# Patient Record
Sex: MALE | Race: BLACK OR AFRICAN AMERICAN | Employment: FULL TIME | URBAN - METROPOLITAN AREA
[De-identification: names, ages, dates, MRNs, and addresses within clinical notes are randomized per-mention and may not be internally consistent; named-entity substitution may affect disease eponyms.]

---

## 2017-01-04 ENCOUNTER — OFFICE VISIT (OUTPATIENT)
Dept: INTERNAL MEDICINE CLINIC | Age: 62
End: 2017-01-04

## 2017-01-04 VITALS
SYSTOLIC BLOOD PRESSURE: 120 MMHG | BODY MASS INDEX: 31.7 KG/M2 | WEIGHT: 247 LBS | HEIGHT: 74 IN | HEART RATE: 77 BPM | RESPIRATION RATE: 18 BRPM | OXYGEN SATURATION: 95 % | TEMPERATURE: 97.7 F | DIASTOLIC BLOOD PRESSURE: 74 MMHG

## 2017-01-04 DIAGNOSIS — I10 ESSENTIAL HYPERTENSION: Primary | ICD-10-CM

## 2017-01-04 DIAGNOSIS — C61 PROSTATE CANCER (HCC): ICD-10-CM

## 2017-01-04 DIAGNOSIS — N18.3 CKD (CHRONIC KIDNEY DISEASE), STAGE 3 (MODERATE): ICD-10-CM

## 2017-01-04 DIAGNOSIS — R73.9 HYPERGLYCEMIA: ICD-10-CM

## 2017-01-04 DIAGNOSIS — E78.00 HYPERCHOLESTEREMIA: ICD-10-CM

## 2017-01-04 NOTE — PROGRESS NOTES
SUBJECTIVE:   Mr. Mingo Rosas. is a 63 y.o. male who is here for follow up of routine medical issues. Previously he saw Dr. Dimple Guillermo, and before that Dr. Dhaliwal Files. Chief Complaint   Patient presents with    Hypertension    Follow-up     pt here today 6 month f/u    Other     pt refused flu shot       Plans to get colonoscopy soon with Dr. Richard Stein. Now sees a urologist at [facility]; no longer sees Dr. Flaquito Pichardo due to insurance; He has had prostate cancer; He completed XRT in 2014. He sees Dr. Hermelinda Mancilla, for CKD. He saw Dr. Cintia Faye and had PFT, due to sarcoid. This issue is quiescent. He had diagnosis years ago, with liver biopsy. He gets yearly eye check. At this time, he is otherwise doing well and has brought no other complaints to my attention today. For a list of the medical issues addressed today, see the assessment and plan below. PMH:   Past Medical History   Diagnosis Date    Arthritis     CKD (chronic kidney disease) 2014    Hypercholesterolemia     Hypertension     [facility] ([facility] [address] 2014    Sarcoid [facility])        Past Surgical History   Procedure Laterality Date    Hx prostatectomy         All: He has No Known Allergies. Current Outpatient Prescriptions   Medication Sig    losartan-hydrochlorothiazide (HYZAAR) 50-12.5 mg per tablet Take 1 Tab by mouth daily.  simvastatin (ZOCOR) 10 mg tablet Take 1 Tab by mouth nightly.  famotidine (PEPCID) 20 mg tablet Take 1 Tab by mouth two (2) times a day. No current facility-administered medications for this visit. FH: His mother  68 of lung cancer, HTN. Father  of lung cancer age 68. One sister  age 1 of heart defect. Sister and brother are alive and well. 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 He has never used smokeless tobacco. He reports that he drinks alcohol.  He reports that he does not use illicit drugs.   ROS: See above; Complete ROS otherwise negative. OBJECTIVE:   Vitals:   Visit Vitals    /74 (BP 1 Location: Left arm, BP Patient Position: Sitting)    Pulse 77    Temp 97.7 °F (36.5 °C) (Oral)    Resp 18    Ht 6' 2\" (1.88 m)    Wt 247 lb (112 kg)    SpO2 95%    BMI 31.71 kg/m2      Gen: Pleasant 63 y.o. W male in NAD. HEENT: PERRLA. EOMI. OP moist and pink. Neck: Supple. No LAD. HEART: RRR, No M/G/R.    LUNGS: CTAB No W/R. ABDOMEN: S, NT, ND, BS+. EXTREMITIES: Warm. No C/C/E.  MUSCULOSKELETAL: Normal ROM, muscle strength 5/5 all groups. NEURO: Alert and oriented x 3. Cranial nerves grossly intact. No focal sensory or motor deficits noted. SKIN: Warm. Dry. No rashes or other lesions noted. Lab Results   Component Value Date/Time    Sodium 142 07/01/2016 10:04 AM    Potassium 4.3 07/01/2016 10:04 AM    Chloride 103 07/01/2016 10:04 AM    CO2 24 07/01/2016 10:04 AM    Anion gap 8 08/06/2013 05:00 AM    Glucose 117 07/01/2016 10:04 AM    BUN 22 07/01/2016 10:04 AM    Creatinine 1.44 07/01/2016 10:04 AM    BUN/Creatinine ratio 15 07/01/2016 10:04 AM    GFR est AA 61 07/01/2016 10:04 AM    GFR est non-AA 53 07/01/2016 10:04 AM    Calcium 9.2 07/01/2016 10:04 AM    Bilirubin, total 0.7 07/01/2016 10:04 AM    ALT 40 07/01/2016 10:04 AM    AST 31 07/01/2016 10:04 AM    Alk.  phosphatase 131 07/01/2016 10:04 AM    Protein, total 7.2 07/01/2016 10:04 AM    Albumin 4.2 07/01/2016 10:04 AM    Globulin 5.5 07/31/2013 12:55 PM    A-G Ratio 1.4 07/01/2016 10:04 AM       Lab Results   Component Value Date/Time    Cholesterol, total 175 07/01/2016 10:04 AM    HDL Cholesterol 43 07/01/2016 10:04 AM    LDL,Direct 93 05/21/2014 12:26 PM    LDL, calculated 101 07/01/2016 10:04 AM    Triglyceride 154 07/01/2016 10:04 AM        Lab Results   Component Value Date/Time    Hemoglobin A1c 6.1 07/01/2016 10:04 AM       Lab Results   Component Value Date/Time    WBC 7.0 07/01/2016 10:04 AM    HGB 14.9 07/01/2016 10:04 AM    HCT 42.2 07/01/2016 10:04 AM    PLATELET 042 37/70/0932 10:04 AM    MCV 90 07/01/2016 10:04 AM         ASSESSMENT/ PLAN:   1. HTN (hypertension): BP fine today. 2. Hypercholesteremia: Last labs look normal. No changes. 3. CKD (chronic kidney disease), stage 3 (moderate): GFR as above. Mild. Sees Dr. Pippa Morris yearly. 4. Prostate cancer: s/p XRT and previous follow up Dr. Hill. He'll need to make an appointment with an in-network urologist.   5. Knee pain, right:  Doing better. 6. Sarcoid: He has seen Dr. Trae Velarde. 7. Hyperglycemia / prediabetes: Watch diet and exercise. 8. Colon cancer screening: Refer to Dr. Veronica Mcneil. Follow-up Disposition: Not on File    I have reviewed the patient's medications and risks/side effects/benefits were discussed. Diagnosis(-es) explained to patient and questions answered. Literature provided where appropriate.

## 2017-01-04 NOTE — PROGRESS NOTES
1. Have you been to the ER, urgent care clinic since your last visit? Hospitalized since your last visit?no    2. Have you seen or consulted any other health care providers outside of the [address] [place] [address] since your last visit? Include any pap smears or colon screening.  no

## 2017-01-04 NOTE — MR AVS SNAPSHOT
Visit Information Date & Time Provider Department Dept. Phone Encounter #  
 1/4/2017 10:30 AM Fab Jhaveri, [address] [place] [address],[address] [phone] [id number] Follow-up Instructions Return in about 6 months (around 7/4/2017) for follow up HTN, etc.  
 Follow-up and Disposition History Upcoming Health Maintenance Date Due COLONOSCOPY 7/10/1975 Pneumococcal 19-64 Highest Risk (1 of 3 - PCV13) 7/10/1976 DTaP/Tdap/Td series (1 - Tdap) 7/10/1978 INFLUENZA AGE 9 TO ADULT 8/1/2016 Allergies as of 1/4/2017  Review Complete On: 1/4/2017 By: Fab Jhaveri MD  
 No Known Allergies Current Immunizations  Reviewed on 11/6/2014 No immunizations on file. Not reviewed this visit You Were Diagnosed With   
  
 Codes Comments Essential hypertension    -  Primary ICD-10-CM: I10 
ICD-9-CM: 401.9 Hypercholesteremia     ICD-10-CM: E78.00 ICD-9-CM: 272.0 CKD (chronic kidney disease), stage 3 (moderate)     ICD-10-CM: N18.3 ICD-9-CM: 593. 3 Prostate cancer [facility])     ICD-10-CM: V96 ICD-9-CM: 360 Hyperglycemia     ICD-10-CM: R73.9 ICD-9-CM: 790.29 Vitals BP Pulse Temp Resp Height(growth percentile) Weight(growth percentile) 120/74 (BP 1 Location: Left arm, BP Patient Position: Sitting) 77 97.7 °F (36.5 °C) (Oral) 18 6' 2\" (1.88 m) 247 lb (112 kg) SpO2 BMI Smoking Status 95% 31.71 kg/m2 Never Smoker Vitals History BMI and BSA Data Body Mass Index Body Surface Area 31.71 kg/m 2 2.42 m 2 Preferred Pharmacy Pharmacy Name Phone [facility] [address] [phone] Your Updated Medication List  
  
   
This list is accurate as of: 1/4/17 11:22 AM.  Always use your most recent med list.  
  
  
  
  
 famotidine 20 mg tablet Commonly known as:  PEPCID Take 1 Tab by mouth two (2) times a day. losartan-hydroCHLOROthiazide 50-12.5 mg per tablet Commonly known as:  HYZAAR Take 1 Tab by mouth daily. simvastatin 10 mg tablet Commonly known as:  ZOCOR Take 1 Tab by mouth nightly. Follow-up Instructions Return in about 6 months (around 7/4/2017) for follow up HTN, etc.  
  
To-Do List   
 05/01/2017 Lab:  CBC WITH AUTOMATED DIFF   
  
 05/01/2017 Lab:  LIPID PANEL   
  
 05/01/2017 Lab:  METABOLIC PANEL, COMPREHENSIVE   
  
 05/01/2017 Lab:  PSA DIAGNOSTIC (PROSTATIC SPECIFIC AG) Introducing [facility] SERVICES! Dear Kira Velez: Thank you for requesting a Give[employer] account. Our records indicate that you have previously registered for a Giveo account but its currently inactive. Please call our Giveo support line at [phone]. Additional Information If you have questions, please visit the Frequently Asked Questions section of the Giveo website at [email or web address] Remember, Giveo is NOT to be used for urgent needs. For medical emergencies, dial 911. Now available from your iPhone and Android! Please provide this summary of care documentation to your next provider. Your primary care clinician is listed as [place]. If you have any questions after today's visit, please call [phone].

## 2017-01-05 LAB
ALBUMIN SERPL-MCNC: 4.2 G/DL (ref 3.5–5.5)
ALBUMIN/GLOB SERPL: 1.4 {RATIO} (ref 1.1–2.5)
ALP SERPL-CCNC: 134 IU/L (ref 39–117)
ALT SERPL-CCNC: 42 IU/L (ref 0–44)
AST SERPL-CCNC: 33 IU/L (ref 0–40)
BASOPHILS # BLD AUTO: 0 X10E3/UL (ref 0–0.2)
BASOPHILS NFR BLD AUTO: 0 %
BILIRUB SERPL-MCNC: 0.6 MG/DL (ref 0–1.2)
BUN SERPL-MCNC: 15 MG/DL (ref 6–24)
BUN/CREAT SERPL: 12 (ref 9–20)
CALCIUM SERPL-MCNC: 9.1 MG/DL (ref 8.7–10.2)
CHLORIDE SERPL-SCNC: 100 MMOL/L (ref 96–106)
CHOLEST SERPL-MCNC: 214 MG/DL (ref 100–199)
CO2 SERPL-SCNC: 25 MMOL/L (ref 18–29)
CREAT SERPL-MCNC: 1.28 MG/DL (ref 0.76–1.27)
EOSINOPHIL # BLD AUTO: 0.2 X10E3/UL (ref 0–0.4)
EOSINOPHIL NFR BLD AUTO: 2 %
ERYTHROCYTE [DISTWIDTH] IN BLOOD BY AUTOMATED COUNT: 14.2 % (ref 12.3–15.4)
GLOBULIN SER CALC-MCNC: 2.9 G/DL (ref 1.5–4.5)
GLUCOSE SERPL-MCNC: 83 MG/DL (ref 65–99)
HCT VFR BLD AUTO: 42.8 % (ref 37.5–51)
HDLC SERPL-MCNC: 43 MG/DL
HGB BLD-MCNC: 15 G/DL (ref 12.6–17.7)
IMM GRANULOCYTES # BLD: 0 X10E3/UL (ref 0–0.1)
IMM GRANULOCYTES NFR BLD: 0 %
LDLC SERPL CALC-MCNC: 126 MG/DL (ref 0–99)
LYMPHOCYTES # BLD AUTO: 2.4 X10E3/UL (ref 0.7–3.1)
LYMPHOCYTES NFR BLD AUTO: 28 %
MCH RBC QN AUTO: 32.1 PG (ref 26.6–33)
MCHC RBC AUTO-ENTMCNC: 35 G/DL (ref 31.5–35.7)
MCV RBC AUTO: 92 FL (ref 79–97)
MONOCYTES # BLD AUTO: 0.9 X10E3/UL (ref 0.1–0.9)
MONOCYTES NFR BLD AUTO: 10 %
NEUTROPHILS # BLD AUTO: 5.2 X10E3/UL (ref 1.4–7)
NEUTROPHILS NFR BLD AUTO: 60 %
PLATELET # BLD AUTO: 177 X10E3/UL (ref 150–379)
POTASSIUM SERPL-SCNC: 4.3 MMOL/L (ref 3.5–5.2)
PROT SERPL-MCNC: 7.1 G/DL (ref 6–8.5)
PSA SERPL-MCNC: 3.9 NG/ML (ref 0–4)
RBC # BLD AUTO: 4.67 X10E6/UL (ref 4.14–5.8)
SODIUM SERPL-SCNC: 141 MMOL/L (ref 134–144)
TRIGL SERPL-MCNC: 226 MG/DL (ref 0–149)
VLDLC SERPL CALC-MCNC: 45 MG/DL (ref 5–40)
WBC # BLD AUTO: 8.8 X10E3/UL (ref 3.4–10.8)

## 2017-01-06 ENCOUNTER — TELEPHONE (OUTPATIENT)
Dept: INTERNAL MEDICINE CLINIC | Age: 62
End: 2017-01-06

## 2017-09-29 ENCOUNTER — HOSPITAL ENCOUNTER (EMERGENCY)
Age: 62
Discharge: HOME | End: 2017-09-29
Attending: FAMILY MEDICINE

## 2017-09-29 ENCOUNTER — APPOINTMENT (OUTPATIENT)
Dept: GENERAL RADIOLOGY | Age: 62
End: 2017-09-29
Attending: FAMILY MEDICINE

## 2017-09-29 VITALS
OXYGEN SATURATION: 97 % | TEMPERATURE: 98.6 F | RESPIRATION RATE: 18 BRPM | HEART RATE: 84 BPM | SYSTOLIC BLOOD PRESSURE: 137 MMHG | HEIGHT: 74 IN | DIASTOLIC BLOOD PRESSURE: 78 MMHG | BODY MASS INDEX: 31.18 KG/M2 | WEIGHT: 243 LBS

## 2017-09-29 DIAGNOSIS — M54.50 ACUTE LEFT-SIDED LOW BACK PAIN WITHOUT SCIATICA: Primary | ICD-10-CM

## 2017-09-29 LAB
BILIRUB UR QL: NEGATIVE
GLUCOSE UR QL STRIP.AUTO: NEGATIVE MG/DL
KETONES UR-MCNC: NEGATIVE MG/DL
LEUKOCYTE ESTERASE UR QL STRIP: NEGATIVE
NITRITE UR QL: NEGATIVE
PH UR: 6 [PH] (ref 5–8)
PROT UR QL: NEGATIVE MG/DL
RBC # UR STRIP: NEGATIVE /UL
SP GR UR: 1.02 (ref 1–1.03)
UROBILINOGEN UR QL: 0.2 EU/DL (ref 0.2–1)

## 2017-09-29 RX ORDER — CYCLOBENZAPRINE HCL 10 MG
10 TABLET ORAL
Qty: 20 TAB | Refills: 0 | Status: SHIPPED | OUTPATIENT
Start: 2017-09-29 | End: 2017-11-14

## 2017-09-29 RX ORDER — PREDNISONE 5 MG/1
TABLET ORAL
Qty: 21 TAB | Refills: 0 | Status: SHIPPED | OUTPATIENT
Start: 2017-09-29 | End: 2017-11-14

## 2017-09-29 NOTE — UC PROVIDER NOTE
Patient is a 63 y.o. male presenting with back pain. The history is provided by the patient. Back Pain    This is a new problem. The current episode started 2 days ago. The problem has not changed since onset. Episode frequency: intermittent with movement. The pain is associated with no known injury. Pain location: left flank; sometimes right flank. The quality of the pain is described as aching. The pain does not radiate. The pain is at a severity of 5/10. Pertinent negatives include no chest pain, no fever, no numbness, no dysuria, no leg pain, no paresthesias, no paresis, no tingling and no weakness. He has tried nothing for the symptoms. Past Medical History:   Diagnosis Date    Arthritis     CKD (chronic kidney disease) 5/21/2014    Hypercholesterolemia     Hypertension     Prostate cancer ([facility] [address] 5/21/2014    [facility]         Past Surgical History:   Procedure Laterality Date    HX PROSTATECTOMY           Family History   Problem Relation Age of Onset    Cancer Mother      Lung Cancer and Brain    Hypertension Mother     Cancer Father 68     Lung Cancer        Social History     Social History    Marital status: SINGLE     Spouse name: N/A    Number of children: N/A    Years of education: N/A     Occupational History    Not on file. Social History Main Topics    Smoking status: Never Smoker    Smokeless tobacco: Never Used    Alcohol use Yes    Drug use: No    Sexual activity: Yes     Partners: Female     Other Topics Concern    Not on file     Social History Narrative                ALLERGIES: Review of patient's allergies indicates no known allergies. Review of Systems   Constitutional: Negative for chills and fever. Respiratory: Negative for shortness of breath and wheezing. Cardiovascular: Negative for chest pain and palpitations. Gastrointestinal: Negative for nausea and vomiting. Genitourinary: Negative for dysuria and frequency.    Musculoskeletal: Positive for back pain and myalgias. Neurological: Negative for tingling, weakness, numbness and paresthesias. Vitals:    09/29/17 1134   BP: 137/78   Pulse: 84   Resp: 18   Temp: 98.6 °F (37 °C)   SpO2: 97%   Weight: 110.2 kg (243 lb)   Height: 6' 2\" (1.88 m)       Physical Exam   Constitutional: He appears well-developed and well-nourished. No distress. Cardiovascular: Normal rate, regular rhythm and normal heart sounds. Pulmonary/Chest: Effort normal and breath sounds normal. No respiratory distress. He has no wheezes. He has no rales. Musculoskeletal:        Lumbar back: He exhibits pain. He exhibits normal range of motion, no tenderness, no bony tenderness, no swelling, no edema, no deformity, no laceration and no spasm. Back:    Neurological: He is alert. Skin: He is not diaphoretic. Psychiatric: He has a normal mood and affect. His behavior is normal. Judgment and thought content normal.   Nursing note and vitals reviewed. MDM     Differential Diagnosis; Clinical Impression; Plan:     CLINICAL IMPRESSION:  Acute left-sided low back pain without sciatica  (primary encounter diagnosis)    Plan:  1. Pred concha  2. Flexeril prn  3. stretching exercises  Amount and/or Complexity of Data Reviewed:   Tests in the radiology section of CPT®:  Ordered and reviewed  Risk of Significant Complications, Morbidity, and/or Mortality:   Presenting problems: Moderate  Diagnostic procedures: Moderate  Management options:   Moderate  Progress:   Patient progress:  Stable      Procedures    Lab Results             POC URINE MACROSCOPIC (Final result) Component (Lab Inquiry)       Collection Time Result Time [address] BLDU UROUPC NITUPC LEUKPC     09/29/17 12:06:00 09/29/17 12:07:47 1.020 6.0 NEGATIVE  NEGATIVE  NEGATIVE  NEGATIVE  NEGATIVE  0.2 NEGATIVE  NEGATIVE

## 2017-09-29 NOTE — DISCHARGE INSTRUCTIONS
Back Pain: Care Instructions  Your Care Instructions    Back pain has many possible causes. It is often related to problems with muscles and ligaments of the back. It may also be related to problems with the nerves, discs, or bones of the back. Moving, lifting, standing, sitting, or sleeping in an awkward way can strain the back. Sometimes you don't notice the injury until later. Arthritis is another common cause of back pain. Although it may hurt a lot, back pain usually improves on its own within several weeks. Most people recover in 12 weeks or less. Using good home treatment and being careful not to stress your back can help you feel better sooner. Follow-up care is a key part of your treatment and safety. Be sure to make and go to all appointments, and call your doctor if you are having problems. Its also a good idea to know your test results and keep a list of the medicines you take. How can you care for yourself at home? · Sit or lie in positions that are most comfortable and reduce your pain. Try one of these positions when you lie down:  ¨ Lie on your back with your knees bent and supported by large pillows. ¨ Lie on the floor with your legs on the seat of a sofa or chair. Haylee Victoria on your side with your knees and hips bent and a pillow between your legs. ¨ Lie on your stomach if it does not make pain worse. · Do not sit up in bed, and avoid soft couches and twisted positions. Bed rest can help relieve pain at first, but it delays healing. Avoid bed rest after the first day of back pain. · Change positions every 30 minutes. If you must sit for long periods of time, take breaks from sitting. Get up and walk around, or lie in a comfortable position. · Try using a heating pad on a low or medium setting for 15 to 20 minutes every 2 or 3 hours. Try a warm shower in place of one session with the heating pad. · You can also try an ice pack for 10 to 15 minutes every 2 to 3 hours.  Put a thin cloth between the ice pack and your skin. · Take pain medicines exactly as directed. ¨ If the doctor gave you a prescription medicine for pain, take it as prescribed. ¨ If you are not taking a prescription pain medicine, ask your doctor if you can take an over-the-counter medicine. · Take short walks several times a day. You can start with 5 to 10 minutes, 3 or 4 times a day, and work up to longer walks. Walk on level surfaces and avoid hills and stairs until your back is better. · Return to work and other activities as soon as you can. Continued rest without activity is usually not good for your back. · To prevent future back pain, do exercises to stretch and strengthen your back and stomach. Learn how to use good posture, safe lifting techniques, and proper body mechanics. When should you call for help? Call your doctor now or seek immediate medical care if:  · You have new or worsening numbness in your legs. · You have new or worsening weakness in your legs. (This could make it hard to stand up.)  · You lose control of your bladder or bowels. Watch closely for changes in your health, and be sure to contact your doctor if:  · Your pain gets worse. · You are not getting better after 2 weeks. Where can you learn more? Go to http://nilda-anastacio.info/. Enter K984 in the search box to learn more about \"Back Pain: Care Instructions. \"  Current as of: March 21, 2017  Content Version: 11.3  © 5748-1528 [employer]. Care instructions adapted under license by [employer] (which disclaims liability or warranty for this information). If you have questions about a medical condition or this instruction, always ask your healthcare professional. [address] any warranty or liability for your use of this information. Low Back Pain: Exercises  Your Care Instructions  Here are some examples of typical rehabilitation exercises for your condition.  Start each exercise slowly. Ease off the exercise if you start to have pain. Your doctor or physical therapist will tell you when you can start these exercises and which ones will work best for you. How to do the exercises  Press-up    1. Lie on your stomach, supporting your body with your forearms. 2. Press your elbows down into the floor to raise your upper back. As you do this, relax your stomach muscles and allow your back to arch without using your back muscles. As your press up, do not let your hips or pelvis come off the floor. 3. Hold for 15 to 30 seconds, then relax. 4. Repeat 2 to 4 times. Alternate arm and leg (bird dog) exercise    Note: Do this exercise slowly. Try to keep your body straight at all times, and do not let one hip drop lower than the other. 1. Start on the floor, on your hands and knees. 2. Tighten your belly muscles. 3. Raise one leg off the floor, and hold it straight out behind you. Be careful not to let your hip drop down, because that will twist your trunk. 4. Hold for about 6 seconds, then lower your leg and switch to the other leg. 5. Repeat 8 to 12 times on each leg. 6. Over time, work up to holding for 10 to 30 seconds each time. 7. If you feel stable and secure with your leg raised, try raising the opposite arm straight out in front of you at the same time. Knee-to-chest exercise    1. Lie on your back with your knees bent and your feet flat on the floor. 2. Bring one knee to your chest, keeping the other foot flat on the floor (or keeping the other leg straight, whichever feels better on your lower back). 3. Keep your lower back pressed to the floor. Hold for at least 15 to 30 seconds. 4. Relax, and lower the knee to the starting position. 5. Repeat with the other leg. Repeat 2 to 4 times with each leg. 6. To get more stretch, put your other leg flat on the floor while pulling your knee to your chest.  Curl-ups    1.  Lie on the floor on your back with your knees bent at a 90-degree angle. Your feet should be flat on the floor, about 12 inches from your buttocks. 2. Cross your arms over your chest. If this bothers your neck, try putting your hands behind your neck (not your head), with your elbows spread apart. 3. Slowly tighten your belly muscles and raise your shoulder blades off the floor. 4. Keep your head in line with your body, and do not press your chin to your chest.  5. Hold this position for 1 or 2 seconds, then slowly lower yourself back down to the floor. 6. Repeat 8 to 12 times. Pelvic tilt exercise    1. Lie on your back with your knees bent. 2. \"Brace\" your stomach. This means to tighten your muscles by pulling in and imagining your belly button moving toward your spine. You should feel like your back is pressing to the floor and your hips and pelvis are rocking back. 3. Hold for about 6 seconds while you breathe smoothly. 4. Repeat 8 to 12 times. Heel dig bridging    1. Lie on your back with both knees bent and your ankles bent so that only your heels are digging into the floor. Your knees should be bent about 90 degrees. 2. Then push your heels into the floor, squeeze your buttocks, and lift your hips off the floor until your shoulders, hips, and knees are all in a straight line. 3. Hold for about 6 seconds as you continue to breathe normally, and then slowly lower your hips back down to the floor and rest for up to 10 seconds. 4. Do 8 to 12 repetitions. Hamstring stretch in doorway    1. Lie on your back in a doorway, with one leg through the open door. 2. Slide your leg up the wall to straighten your knee. You should feel a gentle stretch down the back of your leg. 3. Hold the stretch for at least 15 to 30 seconds. Do not arch your back, point your toes, or bend either knee. Keep one heel touching the floor and the other heel touching the wall. 4. Repeat with your other leg. 5. Do 2 to 4 times for each leg. Hip flexor stretch    1.  Kneel on the floor with one knee bent and one leg behind you. Place your forward knee over your foot. Keep your other knee touching the floor. 2. Slowly push your hips forward until you feel a stretch in the upper thigh of your rear leg. 3. Hold the stretch for at least 15 to 30 seconds. Repeat with your other leg. 4. Do 2 to 4 times on each side. Wall sit    1. Stand with your back 10 to 12 inches away from a wall. 2. Lean into the wall until your back is flat against it. 3. Slowly slide down until your knees are slightly bent, pressing your lower back into the wall. 4. Hold for about 6 seconds, then slide back up the wall. 5. Repeat 8 to 12 times. Follow-up care is a key part of your treatment and safety. Be sure to make and go to all appointments, and call your doctor if you are having problems. It's also a good idea to know your test results and keep a list of the medicines you take. Where can you learn more? Go to http://nilda-anastacio.info/. Enter F763 in the search box to learn more about \"Low Back Pain: Exercises. \"  Current as of: March 21, 2017  Content Version: 11.3  © 5922-3983 [employer]. Care instructions adapted under license by [employer] (which disclaims liability or warranty for this information). If you have questions about a medical condition or this instruction, always ask your healthcare professional. [place] [address] any warranty or liability for your use of this information.

## 2017-10-17 ENCOUNTER — TELEPHONE (OUTPATIENT)
Dept: INTERNAL MEDICINE CLINIC | Age: 62
End: 2017-10-17

## 2017-10-17 NOTE — TELEPHONE ENCOUNTER
----- [facility] sent at 10/17/2017 11:46 AM EDT -----  Regarding: [place]/records  Elda with [employer] is requesting the status of records. She faxed a request on 10/12/17. [facility] phone number is [phone].

## 2017-10-17 NOTE — TELEPHONE ENCOUNTER
Attempted to return call regarding status of records. Voicemail does not state a persons name, no company, no information on message; other than can not accept call to please leave a message. I did not leave a voicemail because it did not verify in any way that it was a company or anyone needing this patients medical records.

## 2017-11-14 ENCOUNTER — OFFICE VISIT (OUTPATIENT)
Dept: INTERNAL MEDICINE CLINIC | Age: 62
End: 2017-11-14

## 2017-11-14 VITALS
TEMPERATURE: 97.9 F | WEIGHT: 247.8 LBS | RESPIRATION RATE: 16 BRPM | OXYGEN SATURATION: 95 % | HEIGHT: 74 IN | SYSTOLIC BLOOD PRESSURE: 137 MMHG | BODY MASS INDEX: 31.8 KG/M2 | DIASTOLIC BLOOD PRESSURE: 83 MMHG | HEART RATE: 85 BPM

## 2017-11-14 DIAGNOSIS — C61 PROSTATE CANCER (HCC): ICD-10-CM

## 2017-11-14 DIAGNOSIS — Z12.11 SCREEN FOR COLON CANCER: ICD-10-CM

## 2017-11-14 DIAGNOSIS — Z00.00 ROUTINE GENERAL MEDICAL EXAMINATION AT A HEALTH CARE FACILITY: Primary | ICD-10-CM

## 2017-11-14 DIAGNOSIS — I10 ESSENTIAL HYPERTENSION: ICD-10-CM

## 2017-11-14 DIAGNOSIS — N18.30 STAGE 3 CHRONIC KIDNEY DISEASE (HCC): ICD-10-CM

## 2017-11-14 DIAGNOSIS — E78.00 HYPERCHOLESTEREMIA: ICD-10-CM

## 2017-11-14 RX ORDER — SIMVASTATIN 10 MG/1
10 TABLET, FILM COATED ORAL
Qty: 30 TAB | Refills: 5 | Status: SHIPPED | OUTPATIENT
Start: 2017-11-14 | End: 2018-08-15 | Stop reason: SDUPTHER

## 2017-11-14 NOTE — MR AVS SNAPSHOT
Visit Information Date & Time Provider Department Dept. Phone Encounter #  
 11/14/2017 11:30 AM Braeden Ervin, [address] [place] [address],[address] [phone] [id number] Follow-up Instructions Return in about 6 months (around 5/14/2018) for HTN. Upcoming Health Maintenance Date Due COLONOSCOPY 7/10/1975 Pneumococcal 19-64 Highest Risk (1 of 3 - PCV13) 7/10/1976 DTaP/Tdap/Td series (1 - Tdap) 7/10/1978 ZOSTER VACCINE AGE 60> 5/10/2017 Allergies as of 11/14/2017  Review Complete On: 11/14/2017 By: Braeden Ervin MD  
 No Known Allergies Current Immunizations  Reviewed on 11/6/2014 No immunizations on file. Not reviewed this visit You Were Diagnosed With   
  
 Codes Comments Essential hypertension    -  Primary ICD-10-CM: I10 
ICD-9-CM: 401.9 Stage 3 chronic kidney disease     ICD-10-CM: N18.3 ICD-9-CM: 656. 3 Hypercholesteremia     ICD-10-CM: E78.00 ICD-9-CM: 272.0 Prostate cancer [facility])     ICD-10-CM: Z31 ICD-9-CM: 909 Screen for colon cancer     ICD-10-CM: Z12.11 ICD-9-CM: V76.51 Vitals BP Pulse Temp Resp Height(growth percentile) Weight(growth percentile) 137/83 (BP 1 Location: Left arm, BP Patient Position: Sitting) 85 97.9 °F (36.6 °C) (Oral) 16 6' 2\" (1.88 m) 247 lb 12.8 oz (112.4 kg) SpO2 BMI Smoking Status 95% 31.82 kg/m2 Never Smoker Vitals History BMI and BSA Data Body Mass Index Body Surface Area  
 31.82 kg/m 2 2.42 m 2 [facility] [phone] Your Updated Medication List  
  
   
This list is accurate as of: 11/14/17 12:22 PM.  Always use your most recent med list.  
  
  
  
  
 losartan-hydroCHLOROthiazide 50-12.5 mg per tablet Commonly known as:  HYZAAR Take 1 Tab by mouth daily. simvastatin 10 mg tablet Commonly known as:  ZOCOR Take 1 Tab by mouth nightly. Prescriptions Sent to Pharmacy Refills  
 simvastatin (ZOCOR) 10 mg tablet 5 Sig: Take 1 Tab by mouth nightly. Class: Normal  
 Pharmacy: [facility] [address] #: [phone] Route: Oral  
  
We Performed the Following CBC WITH AUTOMATED DIFF [30363 CPT(R)] LIPID PANEL [40531 CPT(R)] METABOLIC PANEL, COMPREHENSIVE [81446 CPT(R)] PSA DIAGNOSTIC (PROSTATIC SPECIFIC AG) [id number] CPT(R)] REFERRAL TO GASTROENTEROLOGY [XNM28 Custom] Follow-up Instructions Return in about 6 months (around 5/14/2018) for HTN. Referral Information Referral ID Referred By Referred To  
  
 [id number] Abelino Goff MD   
   [facility] [address] Phone: [phone] Fax: [phone] Visits Status Start Date End Date 1 New Request 11/14/17 11/14/18 If your referral has a status of pending review or denied, additional information will be sent to support the outcome of this decision. Patient Instructions Body Mass Index: Care Instructions Your Care Instructions Body mass index (BMI) can help you see if your weight is raising your risk for health problems. It uses a formula to compare how much you weigh with how tall you are. · A BMI lower than 18.5 is considered underweight. · A BMI between 18.5 and 24.9 is considered healthy. · A BMI between 25 and 29.9 is considered overweight. A BMI of 30 or higher is considered obese. If your BMI is in the normal range, it means that you have a lower risk for weight-related health problems.  If your BMI is in the overweight or obese range, you may be at increased risk for weight-related health problems, such as high blood pressure, heart disease, stroke, arthritis or joint pain, and diabetes. If your BMI is in the underweight range, you may be at increased risk for health problems such as fatigue, lower protection (immunity) against illness, muscle loss, bone loss, hair loss, and hormone problems. BMI is just one measure of your risk for weight-related health problems. You may be at higher risk for health problems if you are not active, you eat an unhealthy diet, or you drink too much alcohol or use tobacco products. Follow-up care is a key part of your treatment and safety. Be sure to make and go to all appointments, and call your doctor if you are having problems. It's also a good idea to know your test results and keep a list of the medicines you take. How can you care for yourself at home? · Practice healthy eating habits. This includes eating plenty of fruits, vegetables, whole grains, lean protein, and low-fat dairy. · If your doctor recommends it, get more exercise. Walking is a good choice. Bit by bit, increase the amount you walk every day. Try for at least 30 minutes on most days of the week. · Do not smoke. Smoking can increase your risk for health problems. If you need help quitting, talk to your doctor about stop-smoking programs and medicines. These can increase your chances of quitting for good. · Limit alcohol to 2 drinks a day for men and 1 drink a day for women. Too much alcohol can cause health problems. If you have a BMI higher than 25 · Your doctor may do other tests to check your risk for weight-related health problems. This may include measuring the distance around your waist. A waist measurement of more than 40 inches in men or 35 inches in women can increase the risk of weight-related health problems. · Talk with your doctor about steps you can take to stay healthy or improve your health. You may need to make lifestyle changes to lose weight and stay healthy, such as changing your diet and getting regular exercise. If you have a BMI lower than 18.5 · Your doctor may do other tests to check your risk for health problems. · Talk with your doctor about steps you can take to stay healthy or improve your health. You may need to make lifestyle changes to gain or maintain weight and stay healthy, such as getting more healthy foods in your diet and doing exercises to build muscle. Where can you learn more? Go to http://nilda-anastacio.info/. Enter S176 in the search box to learn more about \"Body Mass Index: Care Instructions. \" Current as of: October 13, 2016 Content Version: 11.4 © 0813-4794 [employer]. Care instructions adapted under license by [employer] (which disclaims liability or warranty for this information). If you have questions about a medical condition or this instruction, always ask your healthcare professional. [address] any warranty or liability for your use of this information. Introducing [facility]! Dear Abad Meng: Thank you for requesting a Sossee account. Our records indicate that you have previously registered for a Sossee account but its currently inactive. Please call our Sossee support line at [phone]. Additional Information If you have questions, please visit the Frequently Asked Questions section of the Sossee website at [email or web address] Remember, Sossee is NOT to be used for urgent needs. For medical emergencies, dial 911. Now available from your iPhone and Android! Please provide this summary of care documentation to your next provider. Your primary care clinician is listed as [place]. If you have any questions after today's visit, please call [phone].

## 2017-11-14 NOTE — PROGRESS NOTES
SUBJECTIVE:   Mr. Calvin Lee is a 63 y.o. male who is here for follow up of routine medical issues. Previously he saw Dr. Bg Stanley, and before that Dr. Brea Hendrix. Chief Complaint   Patient presents with    Complete Physical     pt here today for physical     Medication Evaluation     pt wants to discuss simvastatin        Back pain bothering him lately. Saw Dr. Obed Gregg at urgent care in late Sept.  Given muscle relaxer and prednisone. Back Pain    This is a new problem. The current episode started 2 days ago. The problem has not changed since onset. Episode frequency: intermittent with movement. The pain is associated with no known injury. Pain location: left flank; sometimes right flank. The quality of the pain is described as aching. The pain does not radiate. The pain is at a severity of 5/10. Pertinent negatives include no chest pain, no fever, no numbness, no dysuria, no leg pain, no paresthesias, no paresis, no tingling and no weakness. He has tried nothing for the symptoms. Has TMJ crepitus. Plans to get colonoscopy soon with Dr. Ceci Esteban. Now sees a urologist at [facility]--Dr. France Lr; no longer sees Dr. Cha Mercedes due to insurance; He has had prostate cancer; He completed XRT in Feb 2014. He sees Dr. Aurelia Mooney, for CKD. He saw Dr. Chau Huddleston and had PFT, due to sarcoid. This issue is quiescent. He had diagnosis years ago, with liver biopsy. He gets yearly eye check. At this time, he is otherwise doing well and has brought no other complaints to my attention today. For a list of the medical issues addressed today, see the assessment and plan below. PMH:   Past Medical History:   Diagnosis Date    Arthritis     CKD (chronic kidney disease) 5/21/2014    Hypercholesterolemia     Hypertension     Prostate cancer ([facility] [address] 5/21/2014    Sarcoid        Past Surgical History:   Procedure Laterality Date    HX PROSTATECTOMY         All: He has No Known Allergies.    Current Outpatient Prescriptions   Medication Sig    losartan-hydrochlorothiazide (HYZAAR) 50-12.5 mg per tablet Take 1 Tab by mouth daily.  predniSONE (STERAPRED) 5 mg dose pack See administration instruction per 5mg dose pack    cyclobenzaprine (FLEXERIL) 10 mg tablet Take 1 Tab by mouth three (3) times daily as needed for Muscle Spasm(s).  simvastatin (ZOCOR) 10 mg tablet Take 1 Tab by mouth nightly. No current facility-administered medications for this visit. FH: His mother  68 of lung cancer, HTN. Father  of lung cancer age 68. One sister  age 1 of heart defect. Sister and brother are alive and well. 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 He has never used smokeless tobacco. He reports that he drinks alcohol. He reports that he does not use illicit drugs. ROS: See above; Complete ROS otherwise negative. OBJECTIVE:   Vitals:   Visit Vitals    /83 (BP 1 Location: Left arm, BP Patient Position: Sitting)    Pulse 85    Temp 97.9 °F (36.6 °C) (Oral)    Resp 16    Ht 6' 2\" (1.88 m)    Wt 247 lb 12.8 oz (112.4 kg)    SpO2 95%    BMI 31.82 kg/m2      Gen: Pleasant 63 y.o. W male in [facility]. HEENT: PERRLA. EOMI. OP moist and pink. Neck: Supple. No LAD. HEART: RRR, No M/G/R.    LUNGS: CTAB No W/R. ABDOMEN: S, NT, ND, BS+. EXTREMITIES: Warm. No C/C/E.  MUSCULOSKELETAL: Normal ROM, muscle strength 5/5 all groups. NEURO: Alert and oriented x 3. Cranial nerves grossly intact. No focal sensory or motor deficits noted. SKIN: Warm. Dry. No rashes or other lesions noted.     Lab Results   Component Value Date/Time    Sodium 141 2017 11:27 AM    Potassium 4.3 2017 11:27 AM    Chloride 100 2017 11:27 AM    CO2 25 2017 11:27 AM    Anion gap 8 2013 05:00 AM    Glucose 83 2017 11:27 AM    BUN 15 2017 11:27 AM    Creatinine 1.28 2017 11:27 AM    BUN/Creatinine ratio 12 01/04/2017 11:27 AM    GFR est AA 70 01/04/2017 11:27 AM    GFR est non-AA 61 01/04/2017 11:27 AM    Calcium 9.1 01/04/2017 11:27 AM    Bilirubin, total 0.6 01/04/2017 11:27 AM    ALT (SGPT) 42 01/04/2017 11:27 AM    AST (SGOT) 33 01/04/2017 11:27 AM    Alk. phosphatase 134 01/04/2017 11:27 AM    Protein, total 7.1 01/04/2017 11:27 AM    Albumin 4.2 01/04/2017 11:27 AM    Globulin 5.5 07/31/2013 12:55 PM    A-G Ratio 1.4 01/04/2017 11:27 AM       Lab Results   Component Value Date/Time    Cholesterol, total 214 01/04/2017 11:27 AM    HDL Cholesterol 43 01/04/2017 11:27 AM    LDL,Direct 93 05/21/2014 12:26 PM    LDL, calculated 126 01/04/2017 11:27 AM    Triglyceride 226 01/04/2017 11:27 AM        Lab Results   Component Value Date/Time    Hemoglobin A1c 6.1 07/01/2016 10:04 AM       Lab Results   Component Value Date/Time    WBC 8.8 01/04/2017 11:27 AM    HGB 15.0 01/04/2017 11:27 AM    HCT 42.8 01/04/2017 11:27 AM    PLATELET 774 42/41/8060 11:27 AM    MCV 92 01/04/2017 11:27 AM         ASSESSMENT/ PLAN:   CPE: Normal exam.     1. HTN (hypertension): BP fine today. 2. Hypercholesteremia: Last labs look normal. No changes. 3. CKD (chronic kidney disease), stage 3 (moderate): GFR as above. Mild. Dr. Sofia Prakash cut him loose. 4. Prostate cancer: s/p XRT and previous follow up Dr. Tiffany Pizarro. Now seeing urologist, Dr. Arden Navas. 5. Knee pain, right:  Doing better. 6. Sarcoid: He has seen Dr. Lynn Ramos. 7. Hyperglycemia / prediabetes: Watch diet and exercise. 8. Colon cancer screening: Refer to Dr. Yeni Choudhary. Follow-up Disposition:  Return in about 6 months (around 5/14/2018) for HTN. I have reviewed the patient's medications and risks/side effects/benefits were discussed. Diagnosis(-es) explained to patient and questions answered. Literature provided where appropriate. Discussed the patient's BMI with him.   The BMI follow up plan is as follows:     dietary management education, guidance, and counseling  encourage exercise  monitor weight  prescribed dietary intake    An After Visit Summary was printed and given to the patient.

## 2017-11-14 NOTE — PATIENT INSTRUCTIONS
Body Mass Index: Care Instructions  Your Care Instructions    Body mass index (BMI) can help you see if your weight is raising your risk for health problems. It uses a formula to compare how much you weigh with how tall you are. · A BMI lower than 18.5 is considered underweight. · A BMI between 18.5 and 24.9 is considered healthy. · A BMI between 25 and 29.9 is considered overweight. A BMI of 30 or higher is considered obese. If your BMI is in the normal range, it means that you have a lower risk for weight-related health problems. If your BMI is in the overweight or obese range, you may be at increased risk for weight-related health problems, such as high blood pressure, heart disease, stroke, arthritis or joint pain, and diabetes. If your BMI is in the underweight range, you may be at increased risk for health problems such as fatigue, lower protection (immunity) against illness, muscle loss, bone loss, hair loss, and hormone problems. BMI is just one measure of your risk for weight-related health problems. You may be at higher risk for health problems if you are not active, you eat an unhealthy diet, or you drink too much alcohol or use tobacco products. Follow-up care is a key part of your treatment and safety. Be sure to make and go to all appointments, and call your doctor if you are having problems. It's also a good idea to know your test results and keep a list of the medicines you take. How can you care for yourself at home? · Practice healthy eating habits. This includes eating plenty of fruits, vegetables, whole grains, lean protein, and low-fat dairy. · If your doctor recommends it, get more exercise. Walking is a good choice. Bit by bit, increase the amount you walk every day. Try for at least 30 minutes on most days of the week. · Do not smoke. Smoking can increase your risk for health problems. If you need help quitting, talk to your doctor about stop-smoking programs and medicines. These can increase your chances of quitting for good. · Limit alcohol to 2 drinks a day for men and 1 drink a day for women. Too much alcohol can cause health problems. If you have a BMI higher than 25  · Your doctor may do other tests to check your risk for weight-related health problems. This may include measuring the distance around your waist. A waist measurement of more than 40 inches in men or 35 inches in women can increase the risk of weight-related health problems. · Talk with your doctor about steps you can take to stay healthy or improve your health. You may need to make lifestyle changes to lose weight and stay healthy, such as changing your diet and getting regular exercise. If you have a BMI lower than 18.5  · Your doctor may do other tests to check your risk for health problems. · Talk with your doctor about steps you can take to stay healthy or improve your health. You may need to make lifestyle changes to gain or maintain weight and stay healthy, such as getting more healthy foods in your diet and doing exercises to build muscle. Where can you learn more? Go to http://nilda-anastacio.info/. Enter S176 in the search box to learn more about \"Body Mass Index: Care Instructions. \"  Current as of: October 13, 2016  Content Version: 11.4  © 3744-7612 [employer]. Care instructions adapted under license by [employer] (which disclaims liability or warranty for this information). If you have questions about a medical condition or this instruction, always ask your healthcare professional. [address] any warranty or liability for your use of this information.

## 2017-11-14 NOTE — PROGRESS NOTES
1. Have you been to the ER, urgent care clinic since your last visit? Hospitalized since your last visit?[facility] urgent care  2. Have you seen or consulted any other health care providers outside of the [address] [place] [address] since your last visit? Include any pap smears or colon screening.  no

## 2017-11-15 ENCOUNTER — TELEPHONE (OUTPATIENT)
Dept: INTERNAL MEDICINE CLINIC | Age: 62
End: 2017-11-15

## 2017-11-15 LAB
ALBUMIN SERPL-MCNC: 4.1 G/DL (ref 3.6–4.8)
ALBUMIN/GLOB SERPL: 1.4 {RATIO} (ref 1.2–2.2)
ALP SERPL-CCNC: 127 IU/L (ref 39–117)
ALT SERPL-CCNC: 41 IU/L (ref 0–44)
AST SERPL-CCNC: 30 IU/L (ref 0–40)
BASOPHILS # BLD AUTO: 0 X10E3/UL (ref 0–0.2)
BASOPHILS NFR BLD AUTO: 0 %
BILIRUB SERPL-MCNC: 0.6 MG/DL (ref 0–1.2)
BUN SERPL-MCNC: 17 MG/DL (ref 8–27)
BUN/CREAT SERPL: 13 (ref 10–24)
CALCIUM SERPL-MCNC: 9.1 MG/DL (ref 8.6–10.2)
CHLORIDE SERPL-SCNC: 101 MMOL/L (ref 96–106)
CHOLEST SERPL-MCNC: 189 MG/DL (ref 100–199)
CO2 SERPL-SCNC: 26 MMOL/L (ref 18–29)
CREAT SERPL-MCNC: 1.34 MG/DL (ref 0.76–1.27)
EOSINOPHIL # BLD AUTO: 0.1 X10E3/UL (ref 0–0.4)
EOSINOPHIL NFR BLD AUTO: 1 %
ERYTHROCYTE [DISTWIDTH] IN BLOOD BY AUTOMATED COUNT: 14.1 % (ref 12.3–15.4)
EST. AVERAGE GLUCOSE BLD GHB EST-MCNC: 126 MG/DL
GFR SERPLBLD CREATININE-BSD FMLA CKD-EPI: 57 ML/MIN/1.73
GFR SERPLBLD CREATININE-BSD FMLA CKD-EPI: 66 ML/MIN/1.73
GLOBULIN SER CALC-MCNC: 2.9 G/DL (ref 1.5–4.5)
GLUCOSE SERPL-MCNC: 89 MG/DL (ref 65–99)
HBA1C MFR BLD: 6 % (ref 4.8–5.6)
HCT VFR BLD AUTO: 42 % (ref 37.5–51)
HDLC SERPL-MCNC: 44 MG/DL
HGB BLD-MCNC: 14.6 G/DL (ref 12.6–17.7)
IMM GRANULOCYTES # BLD: 0 X10E3/UL (ref 0–0.1)
IMM GRANULOCYTES NFR BLD: 0 %
LDLC SERPL CALC-MCNC: 97 MG/DL (ref 0–99)
LYMPHOCYTES # BLD AUTO: 2.5 X10E3/UL (ref 0.7–3.1)
LYMPHOCYTES NFR BLD AUTO: 28 %
MCH RBC QN AUTO: 31.5 PG (ref 26.6–33)
MCHC RBC AUTO-ENTMCNC: 34.8 G/DL (ref 31.5–35.7)
MCV RBC AUTO: 91 FL (ref 79–97)
MONOCYTES # BLD AUTO: 0.6 X10E3/UL (ref 0.1–0.9)
MONOCYTES NFR BLD AUTO: 7 %
NEUTROPHILS # BLD AUTO: 5.6 X10E3/UL (ref 1.4–7)
NEUTROPHILS NFR BLD AUTO: 64 %
PLATELET # BLD AUTO: 171 X10E3/UL (ref 150–379)
POTASSIUM SERPL-SCNC: 4.2 MMOL/L (ref 3.5–5.2)
PROT SERPL-MCNC: 7 G/DL (ref 6–8.5)
PSA SERPL-MCNC: 6.7 NG/ML (ref 0–4)
RBC # BLD AUTO: 4.64 X10E6/UL (ref 4.14–5.8)
SODIUM SERPL-SCNC: 142 MMOL/L (ref 134–144)
TRIGL SERPL-MCNC: 240 MG/DL (ref 0–149)
VLDLC SERPL CALC-MCNC: 48 MG/DL (ref 5–40)
WBC # BLD AUTO: 8.8 X10E3/UL (ref 3.4–10.8)

## 2017-12-06 ENCOUNTER — TELEPHONE (OUTPATIENT)
Dept: INTERNAL MEDICINE CLINIC | Age: 62
End: 2017-12-06

## 2018-01-12 ENCOUNTER — TELEPHONE (OUTPATIENT)
Dept: INTERNAL MEDICINE CLINIC | Age: 63
End: 2018-01-12

## 2018-01-12 NOTE — TELEPHONE ENCOUNTER
Dr. Sherlyn Workman  Gastroenterology   [address]  1 [facility], [address] [place] [address]   [phone]  Identified patient 2 identifiers verified. Patient has the information. No further questions or concerns.

## 2018-01-12 NOTE — TELEPHONE ENCOUNTER
Pt is requesting phone number for Dr. Starkey Client (colonoscopy)     Best Contact #: [phone]       Message received & copied from Rhonda Garcia

## 2018-01-29 ENCOUNTER — HOSPITAL ENCOUNTER (OUTPATIENT)
Dept: MRI IMAGING | Age: 63
Discharge: HOME | End: 2018-01-29
Attending: UROLOGY
Payer: COMMERCIAL

## 2018-01-29 VITALS — BODY MASS INDEX: 30.81 KG/M2 | WEIGHT: 240 LBS

## 2018-01-29 DIAGNOSIS — C61 MALIGNANT NEOPLASM OF PROSTATE (HCC): ICD-10-CM

## 2018-01-29 DIAGNOSIS — N40.0 BENIGN ENLARGEMENT OF PROSTATE: ICD-10-CM

## 2018-01-29 DIAGNOSIS — R97.21 INCREASING PROSTATE SPECIFIC ANTIGEN (PSA) LEVEL AFTER TREATMENT FOR MALIGNANT NEOPLASM OF PROSTATE: ICD-10-CM

## 2018-01-29 PROCEDURE — A9577 INJ MULTIHANCE: HCPCS | Performed by: UROLOGY

## 2018-01-29 PROCEDURE — 74011000258 HC RX REV CODE- 258: Performed by: UROLOGY

## 2018-01-29 PROCEDURE — 72197 MRI PELVIS W/O & W/DYE: CPT

## 2018-01-29 PROCEDURE — 74011250636 HC RX REV CODE- 250/636: Performed by: UROLOGY

## 2018-01-29 RX ADMIN — GADOBENATE DIMEGLUMINE 20 ML: 529 INJECTION, SOLUTION INTRAVENOUS at 21:00

## 2018-01-29 RX ADMIN — SODIUM CHLORIDE 100 ML: 900 INJECTION, SOLUTION INTRAVENOUS at 21:00

## 2018-07-07 ENCOUNTER — OFFICE VISIT (OUTPATIENT)
Dept: URGENT CARE | Age: 63
End: 2018-07-07

## 2018-07-07 VITALS
RESPIRATION RATE: 16 BRPM | HEART RATE: 89 BPM | OXYGEN SATURATION: 95 % | WEIGHT: 240 LBS | TEMPERATURE: 97.9 F | DIASTOLIC BLOOD PRESSURE: 75 MMHG | SYSTOLIC BLOOD PRESSURE: 129 MMHG | BODY MASS INDEX: 30.8 KG/M2 | HEIGHT: 74 IN

## 2018-07-07 DIAGNOSIS — L03.011 PARONYCHIA OF FINGER, RIGHT: Primary | ICD-10-CM

## 2018-07-07 RX ORDER — CEPHALEXIN 500 MG/1
500 CAPSULE ORAL 4 TIMES DAILY
Qty: 40 CAP | Refills: 0 | Status: SHIPPED | OUTPATIENT
Start: 2018-07-07 | End: 2018-07-17

## 2018-07-07 NOTE — PATIENT INSTRUCTIONS
Paronychia: Care Instructions  Your Care Instructions  Paronychia (say \"pckw-wz-ZR-urszula-uh\") is an infection of the skin around a fingernail or toenail. It happens when germs enter through a break in the skin. The doctor may have made a small cut in the infected area to drain the pus. Most cases of paronychia improve in a few days. But watch your symptoms and follow your doctor's advice. Though rare, a mild case can turn into something more serious and infect your entire finger or toe. Also, it is possible for an infection to return. Follow-up care is a key part of your treatment and safety. Be sure to make and go to all appointments, and call your doctor if you are having problems. It's also a good idea to know your test results and keep a list of the medicines you take. How can you care for yourself at home? · If your doctor told you how to care for your infected nail, follow the doctor's instructions. If you did not get instructions, follow this general advice:  ¨ Wash the area with clean water 2 times a day. Don't use hydrogen peroxide or alcohol, which can slow healing. ¨ You may cover the area with a thin layer of petroleum jelly, such as Vaseline, and a nonstick bandage. ¨ Apply more petroleum jelly and replace the bandage as needed. · If your doctor prescribed antibiotics, take them as directed. Do not stop taking them just because you feel better. You need to take the full course of antibiotics. · Take an over-the-counter pain medicine, such as acetaminophen (Tylenol), ibuprofen (Advil, Motrin), or naproxen (Aleve). Read and follow all instructions on the label. · Do not take two or more pain medicines at the same time unless the doctor told you to. Many pain medicines have acetaminophen, which is Tylenol. Too much acetaminophen (Tylenol) can be harmful. · Prop up the toe or finger so that it is higher than the level of your heart. This will help with pain and swelling. · Apply heat.  Put a warm water bottle, heating pad set on low, or warm cloth on your finger or toe. Do not go to sleep with a heating pad on your skin. · Soak the area in warm water twice a day for 15 minutes each time. After soaking, dry the area well and apply a thin layer of petroleum jelly, such as Vaseline. Put on a new bandage. When should you call for help? Call your doctor now or seek immediate medical care if:  ? · You have signs of new or worsening infection, such as:  ¨ Increased pain, swelling, warmth, or redness. ¨ Red streaks leading from the infected skin. ¨ Pus draining from the area. ¨ A fever. ? Watch closely for changes in your health, and be sure to contact your doctor if:  ? · You do not get better as expected. Where can you learn more? Go to http://nilda-anastacio.info/. Enter C435 in the search box to learn more about \"Paronychia: Care Instructions. \"  Current as of: October 13, 2016  Content Version: 11.4  © 2746-2838 [employer]. Care instructions adapted under license by "[employer] (which disclaims liability or warranty for this information). If you have questions about a medical condition or this instruction, always ask your healthcare professional. [address] any warranty or liability for your use of this information.

## 2018-07-07 NOTE — PROGRESS NOTES
Patient is a 63 y.o. male presenting with skin problem. Skin Problem   This is a new (swelling and pain on rt thumb- staerted after nail  cleaning ) problem. The current episode started more than 2 days ago. The problem occurs constantly. The problem has been gradually worsening. Associated symptoms comments: Local tenderness. He has tried nothing for the symptoms. Past Medical History:   Diagnosis Date    Arthritis     CKD (chronic kidney disease) 5/21/2014    Hypercholesterolemia     Hypertension     Prostate cancer ([address] 5/21/2014    Sarcoid         Past Surgical History:   Procedure Laterality Date    HX PROSTATECTOMY           Family History   Problem Relation Age of Onset    Cancer Mother      Lung Cancer and Brain    Hypertension Mother     Cancer Father 68     Lung Cancer        Social History     Social History    Marital status:      Spouse name: N/A    Number of children: N/A    Years of education: N/A     Occupational History    Not on file. Social History Main Topics    Smoking status: Never Smoker    Smokeless tobacco: Never Used    Alcohol use Yes    Drug use: No    Sexual activity: Yes     Partners: Female     Other Topics Concern    Not on file     Social History Narrative                ALLERGIES: Review of patient's allergies indicates no known allergies. Review of Systems   All other systems reviewed and are negative. Vitals:    07/07/18 1723   BP: 129/75   Pulse: 89   Resp: 16   Temp: 97.9 °F (36.6 °C)   SpO2: 95%   Weight: 240 lb (108.9 kg)   Height: 6' 2\" (1.88 m)       Physical Exam   Musculoskeletal:        Right hand: He exhibits decreased range of motion, tenderness and swelling (on lateral of the thumb ). Normal sensation noted. Normal strength noted. Hands:  Nursing note and vitals reviewed. MDM    Procedures     Small amount of pus trained with needle puncture. ICD-10-CM ICD-9-CM    1.  Paronychia of finger, right L03.011 681.02     thumb     Warm compress    Medications Ordered Today   Medications    cephALEXin (KEFLEX) 500 mg capsule     Sig: Take 1 Cap by mouth four (4) times daily for 10 days. Dispense:  40 Cap     Refill:  0     No results found for any visits on 07/07/18. The patients condition was discussed with the patient and they understand. The patient is to follow up with primary care doctor. If signs and symptoms become worse the pt is to go to the ER. The patient is to take medications as prescribed.

## 2018-07-07 NOTE — MR AVS SNAPSHOT
[address] [facility] [address] 
[phone] Patient: Denise Oseguera MRN: [id number] RRI:[id number] Visit Information Date & Time Provider Department Dept. Phone Encounter #  
 7/7/2018  5:15 PM [address] [facility] [phone] [id number] Follow-up Instructions Return if symptoms worsen or fail to improve, for Follow up with PCP. Upcoming Health Maintenance Date Due DTaP/Tdap/Td series (1 - Tdap) 7/10/1978 ZOSTER VACCINE AGE 60> 5/10/2017 Influenza Age 5 to Adult 8/1/2018 Pneumococcal 19-64 Highest Risk (2 of 3 - PCV13) 2/22/2019 COLONOSCOPY 2/12/2028 Allergies as of 7/7/2018  Review Complete On: 7/7/2018 By: Shilpa Galarza RN No Known Allergies Current Immunizations  Reviewed on 11/6/2014 Name Date Influenza Vaccine 2/20/2018 Pneumococcal Polysaccharide (PPSV-23) 2/22/2018 Not reviewed this visit You Were Diagnosed With   
  
 Codes Comments Paronychia of finger, right    -  Primary ICD-10-CM: L03.011 
ICD-9-CM: 681.02 thumb Vitals BP Pulse Temp Resp Height(growth percentile) Weight(growth percentile) 129/75 89 97.9 °F (36.6 °C) 16 6' 2\" (1.88 m) 240 lb (108.9 kg) SpO2 BMI Smoking Status 95% 30.81 kg/m2 Never Smoker BMI and BSA Data Body Mass Index Body Surface Area  
 30.81 kg/m 2 2.38 m 2 Preferred Pharmacy Pharmacy Name Phone [facility] [phone] Your Updated Medication List  
  
   
This list is accurate as of 7/7/18  5:43 PM.  Always use your most recent med list.  
  
  
  
  
 cephALEXin 500 mg capsule Commonly known as:  Dellia Cons Take 1 Cap by mouth four (4) times daily for 10 days. losartan-hydroCHLOROthiazide 50-12.5 mg per tablet Commonly known as:  HYZAAR Take 1 Tab by mouth daily. simvastatin 10 mg tablet Commonly known as:  ZOCOR Take 1 Tab by mouth nightly. Prescriptions Sent to Pharmacy Refills  
 cephALEXin (KEFLEX) 500 mg capsule 0 Sig: Take 1 Cap by mouth four (4) times daily for 10 days. Class: Normal  
 Pharmacy: [facility] [address] #: [phone] Route: Oral  
  
Follow-up Instructions Return if symptoms worsen or fail to improve, for Follow up with PCP. Patient Instructions Paronychia: Care Instructions Your Care Instructions Paronychia (say \"rfyj-cf-YA-urszula-uh\") is an infection of the skin around a fingernail or toenail. It happens when germs enter through a break in the skin. The doctor may have made a small cut in the infected area to drain the pus. Most cases of paronychia improve in a few days. But watch your symptoms and follow your doctor's advice. Though rare, a mild case can turn into something more serious and infect your entire finger or toe. Also, it is possible for an infection to return. Follow-up care is a key part of your treatment and safety. Be sure to make and go to all appointments, and call your doctor if you are having problems. It's also a good idea to know your test results and keep a list of the medicines you take. How can you care for yourself at home? · If your doctor told you how to care for your infected nail, follow the doctor's instructions. If you did not get instructions, follow this general advice: ¨ Wash the area with clean water 2 times a day. Don't use hydrogen peroxide or alcohol, which can slow healing. ¨ You may cover the area with a thin layer of petroleum jelly, such as Vaseline, and a nonstick bandage. ¨ Apply more petroleum jelly and replace the bandage as needed. · If your doctor prescribed antibiotics, take them as directed.  Do not stop taking them just because you feel better. You need to take the full course of antibiotics. · Take an over-the-counter pain medicine, such as acetaminophen (Tylenol), ibuprofen (Advil, Motrin), or naproxen (Aleve). Read and follow all instructions on the label. · Do not take two or more pain medicines at the same time unless the doctor told you to. Many pain medicines have acetaminophen, which is Tylenol. Too much acetaminophen (Tylenol) can be harmful. · Prop up the toe or finger so that it is higher than the level of your heart. This will help with pain and swelling. · Apply heat. Put a warm water bottle, heating pad set on low, or warm cloth on your finger or toe. Do not go to sleep with a heating pad on your skin. · Soak the area in warm water twice a day for 15 minutes each time. After soaking, dry the area well and apply a thin layer of petroleum jelly, such as Vaseline. Put on a new bandage. When should you call for help? Call your doctor now or seek immediate medical care if: 
? · You have signs of new or worsening infection, such as: 
¨ Increased pain, swelling, warmth, or redness. ¨ Red streaks leading from the infected skin. ¨ Pus draining from the area. ¨ A fever. ? Watch closely for changes in your health, and be sure to contact your doctor if: 
? · You do not get better as expected. Where can you learn more? Go to http://nilda-anastaico.info/. Enter C435 in the search box to learn more about \"Paronychia: Care Instructions. \" Current as of: October 13, 2016 Content Version: 11.4 © 2034-6318 [employer]iv[employer]. Care instructions adapted under license by [employer] (which disclaims liability or warranty for this information). If you have questions about a medical condition or this instruction, always ask your healthcare professional. [address] any warranty or liability for your use of this information. Introducing [facility]! [facility] introduces Convrrt patient portal. Now you can access parts of your medical record, email your doctor's office, and request medication refills online. 1. In your internet browser, go to [email or web address]"t 2. Click on the First Time User? Click Here link in the Sign In box. You will see the New Member Sign Up page. 3. Enter your Convrrt Access Code exactly as it appears below. You will not need to use this code after youve completed the sign-up process. If you do not sign up before the expiration date, you must request a new code. · Convrrt Access Code: [email or web address] Expires: 10/5/2018  5:43 PM 
 
4. Enter the last four digits of your Social Security Number (xxxx) and Date of Birth (mm/dd/yyyy) as indicated and click Submit. You will be taken to the next sign-up page. 5. Create a Convrrt ID. This will be your Convrrt login ID and cannot be changed, so think of one that is secure and easy to remember. 6. Create a Convrrt password. You can change your password at any time. 7. Enter your Password Reset Question and Answer. This can be used at a later time if you forget your password. 8. Enter your e-mail address. You will receive e-mail notification when new information is available in [address] 9. Click Sign Up. You can now view and download portions of your medical record. 10. Click the Download Summary menu link to download a portable copy of your medical information. If you have questions, please visit the Frequently Asked Questions section of the Convrrt website. Remember, Convrrt is NOT to be used for urgent needs. For medical emergencies, dial 911. Now available from your iPhone and Android! Please provide this summary of care documentation to your next provider. Your primary care clinician is listed as [place]. If you have any questions after today's visit, please call [phone].

## 2018-08-03 ENCOUNTER — OFFICE VISIT (OUTPATIENT)
Dept: INTERNAL MEDICINE CLINIC | Age: 63
End: 2018-08-03

## 2018-08-03 VITALS
OXYGEN SATURATION: 96 % | RESPIRATION RATE: 16 BRPM | HEART RATE: 89 BPM | SYSTOLIC BLOOD PRESSURE: 120 MMHG | TEMPERATURE: 97.8 F | BODY MASS INDEX: 31.26 KG/M2 | DIASTOLIC BLOOD PRESSURE: 77 MMHG | HEIGHT: 74 IN | WEIGHT: 243.6 LBS

## 2018-08-03 DIAGNOSIS — C61 PROSTATE CANCER (HCC): ICD-10-CM

## 2018-08-03 DIAGNOSIS — N18.30 STAGE 3 CHRONIC KIDNEY DISEASE (HCC): ICD-10-CM

## 2018-08-03 DIAGNOSIS — I10 ESSENTIAL HYPERTENSION: Primary | ICD-10-CM

## 2018-08-03 DIAGNOSIS — E78.00 HYPERCHOLESTEREMIA: ICD-10-CM

## 2018-08-03 DIAGNOSIS — R73.9 BORDERLINE HYPERGLYCEMIA: ICD-10-CM

## 2018-08-03 NOTE — PATIENT INSTRUCTIONS
Body Mass Index: Care Instructions  Your Care Instructions    Body mass index (BMI) can help you see if your weight is raising your risk for health problems. It uses a formula to compare how much you weigh with how tall you are. · A BMI lower than 18.5 is considered underweight. · A BMI between 18.5 and 24.9 is considered healthy. · A BMI between 25 and 29.9 is considered overweight. A BMI of 30 or higher is considered obese. If your BMI is in the normal range, it means that you have a lower risk for weight-related health problems. If your BMI is in the overweight or obese range, you may be at increased risk for weight-related health problems, such as high blood pressure, heart disease, stroke, arthritis or joint pain, and diabetes. If your BMI is in the underweight range, you may be at increased risk for health problems such as fatigue, lower protection (immunity) against illness, muscle loss, bone loss, hair loss, and hormone problems. BMI is just one measure of your risk for weight-related health problems. You may be at higher risk for health problems if you are not active, you eat an unhealthy diet, or you drink too much alcohol or use tobacco products. Follow-up care is a key part of your treatment and safety. Be sure to make and go to all appointments, and call your doctor if you are having problems. It's also a good idea to know your test results and keep a list of the medicines you take. How can you care for yourself at home? · Practice healthy eating habits. This includes eating plenty of fruits, vegetables, whole grains, lean protein, and low-fat dairy. · If your doctor recommends it, get more exercise. Walking is a good choice. Bit by bit, increase the amount you walk every day. Try for at least 30 minutes on most days of the week. · Do not smoke. Smoking can increase your risk for health problems. If you need help quitting, talk to your doctor about stop-smoking programs and medicines. These can increase your chances of quitting for good. · Limit alcohol to 2 drinks a day for men and 1 drink a day for women. Too much alcohol can cause health problems. If you have a BMI higher than 25  · Your doctor may do other tests to check your risk for weight-related health problems. This may include measuring the distance around your waist. A waist measurement of more than 40 inches in men or 35 inches in women can increase the risk of weight-related health problems. · Talk with your doctor about steps you can take to stay healthy or improve your health. You may need to make lifestyle changes to lose weight and stay healthy, such as changing your diet and getting regular exercise. If you have a BMI lower than 18.5  · Your doctor may do other tests to check your risk for health problems. · Talk with your doctor about steps you can take to stay healthy or improve your health. You may need to make lifestyle changes to gain or maintain weight and stay healthy, such as getting more healthy foods in your diet and doing exercises to build muscle. Where can you learn more? Go to http://nilda-anastacio.info/. Enter S176 in the search box to learn more about \"Body Mass Index: Care Instructions. \"  Current as of: October 13, 2016  Content Version: 11.4  © 7138-8093 Healthwise, Incorporated. Care instructions adapted under license by [employer] (which disclaims liability or warranty for this information). If you have questions about a medical condition or this instruction, always ask your healthcare professional. [address] any warranty or liability for your use of this information.

## 2018-08-03 NOTE — PROGRESS NOTES
1. Have you been to the ER, urgent care clinic since your last visit? Hospitalized since your last visit? [facility]    2. Have you seen or consulted any other health care providers outside of the [address] [place] [address] since your last visit? Include any pap smears or colon screening.  no

## 2018-08-03 NOTE — PROGRESS NOTES
SUBJECTIVE:   Mr. Marilee Dick. is a 66 y.o. male who is here for follow up of routine medical issues. Previously he saw Dr. Sukhwinder Acevedo, and before that Dr. Dean Solitario. Chief Complaint   Patient presents with    Hypertension     pt here today for [facility] parker and discuss fasting lab work including PSA       Back pain stable. \"The xrays showed arthritis in my back. \"  TMJ hasn't been bothering him. Had colonoscopy and polypectomy with Dr. Jose Rock in 2018. Now sees a urologist at [facility]--Dr. Nemesio Burt; no longer sees Dr. Nani Brunson due to insurance; He has had prostate cancer; He completed XRT in 2014. He sees Dr. Zulema Cline, for CKD. He saw Dr. Randi Morgan and had PFT, due to sarcoid. This issue is quiescent. He had diagnosis years ago, with liver biopsy. He gets yearly eye check. At this time, he is otherwise doing well and has brought no other complaints to my attention today. For a list of the medical issues addressed today, see the assessment and plan below. PMH:   Past Medical History:   Diagnosis Date    Arthritis     CKD (chronic kidney disease) 2014    Hypercholesterolemia     Hypertension     Prostate cancer ([facility] [address] 2014    Sarcoid        Past Surgical History:   Procedure Laterality Date    HX PROSTATECTOMY         All: He has No Known Allergies. Current Outpatient Prescriptions   Medication Sig    simvastatin (ZOCOR) 10 mg tablet Take 1 Tab by mouth nightly.  losartan-hydrochlorothiazide (HYZAAR) 50-12.5 mg per tablet Take 1 Tab by mouth daily. No current facility-administered medications for this visit. FH: His mother  68 of lung cancer, HTN. Father  of lung cancer age 68. One sister  age 1 of heart defect. Sister and brother are alive and well. 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 He has never used smokeless tobacco. He reports that he drinks alcohol.  He reports that he does not use illicit drugs. ROS: See above; Complete ROS otherwise negative. OBJECTIVE:   Vitals:   Visit Vitals    /77 (BP 1 Location: Left arm, BP Patient Position: Sitting)    Pulse 89    Temp 97.8 °F (36.6 °C) (Oral)    Resp 16    Ht 6' 2\" (1.88 m)    Wt 243 lb 9.6 oz (110.5 kg)    SpO2 96%    BMI 31.28 kg/m2      Gen: Pleasant 66 y.o. W male in NAD. HEENT: PERRLA. EOMI. OP moist and pink. Neck: Supple. No LAD. HEART: RRR, No M/G/R.    LUNGS: CTAB No W/R. ABDOMEN: S, NT, ND, BS+. EXTREMITIES: Warm. No C/C/E.  MUSCULOSKELETAL: Normal ROM, muscle strength 5/5 all groups. NEURO: Alert and oriented x 3. Cranial nerves grossly intact. No focal sensory or motor deficits noted. SKIN: Warm. Dry. No rashes or other lesions noted. Lab Results   Component Value Date/Time    Sodium 142 11/14/2017 10:29 AM    Potassium 4.2 11/14/2017 10:29 AM    Chloride 101 11/14/2017 10:29 AM    CO2 26 11/14/2017 10:29 AM    Anion gap 8 08/06/2013 05:00 AM    Glucose 89 11/14/2017 10:29 AM    BUN 17 11/14/2017 10:29 AM    Creatinine 1.34 (H) 11/14/2017 10:29 AM    BUN/Creatinine ratio 13 11/14/2017 10:29 AM    GFR est AA 66 11/14/2017 10:29 AM    GFR est non-AA 57 (L) 11/14/2017 10:29 AM    Calcium 9.1 11/14/2017 10:29 AM    Bilirubin, total 0.6 11/14/2017 10:29 AM    ALT (SGPT) 41 11/14/2017 10:29 AM    AST (SGOT) 30 11/14/2017 10:29 AM    Alk.  phosphatase 127 (H) 11/14/2017 10:29 AM    Protein, total 7.0 11/14/2017 10:29 AM    Albumin 4.1 11/14/2017 10:29 AM    Globulin 5.5 (H) 07/31/2013 12:55 PM    A-G Ratio 1.4 11/14/2017 10:29 AM       Lab Results   Component Value Date/Time    Cholesterol, total 189 11/14/2017 10:29 AM    HDL Cholesterol 44 11/14/2017 10:29 AM    LDL,Direct 93 05/21/2014 12:26 PM    LDL, calculated 97 11/14/2017 10:29 AM    Triglyceride 240 (H) 11/14/2017 10:29 AM        Lab Results   Component Value Date/Time    Hemoglobin A1c 6.0 (H) 11/14/2017 10:30 AM       Lab Results   Component Value Date/Time    WBC 8.8 11/14/2017 10:29 AM    HGB 14.6 11/14/2017 10:29 AM    HCT 42.0 11/14/2017 10:29 AM    PLATELET 233 36/01/9350 10:29 AM    MCV 91 11/14/2017 10:29 AM         ASSESSMENT/ PLAN:   CPE: Normal exam.     1. HTN (hypertension): BP fine today. 2. Hypercholesteremia: Last labs look normal. No changes. 3. CKD (chronic kidney disease), stage 3 (moderate): GFR as above. Mild. Dr. Sofia Prakash cut him loose. 4. Prostate cancer: s/p XRT and previous follow up Dr. Tiffany Pizarro. Now seeing urologist, Dr. Arden Navas. 5. Knee pain, right:  Doing better. 6. Sarcoid: He has seen Dr. Lynn Ramos. 7. Hyperglycemia / prediabetes: Watch diet and exercise. 8. Colon cancer screening: Refer to Dr. Yeni Choudhary. Follow-up Disposition:  Return in about 6 months (around 2/3/2019) for HTN. I have reviewed the patient's medications and risks/side effects/benefits were discussed. Diagnosis(-es) explained to patient and questions answered. Literature provided where appropriate. Discussed the patient's BMI with him. The BMI follow up plan is as follows:     dietary management education, guidance, and counseling  encourage exercise  monitor weight  prescribed dietary intake    An After Visit Summary was printed and given to the patient.

## 2018-08-03 NOTE — MR AVS SNAPSHOT
[facility] [address] [facility] 
[phone] Patient: Jossue Zhang. MRN:  [id number] Visit Information Date & Time Provider Department Dept. Phone Encounter #  
 8/3/2018 11:15 AM Kacie Roberts, [address] [id number] Follow-up Instructions Return in about 6 months (around 2/3/2019) for HTN. Follow-up and Disposition History Upcoming Health Maintenance Date Due DTaP/Tdap/Td series (1 - Tdap) 7/10/1978 ZOSTER VACCINE AGE 60> 5/10/2017 Influenza Age 5 to Adult 8/1/2018 Pneumococcal 19-64 Highest Risk (2 of 3 - PCV13) 2/22/2019 COLONOSCOPY 2/12/2028 Allergies as of 8/3/2018  Review Complete On: 8/3/2018 By: Kacie Roberts MD  
 No Known Allergies Current Immunizations  Reviewed on 11/6/2014 Name Date Influenza Vaccine 2/20/2018 Pneumococcal Polysaccharide (PPSV-23) 2/22/2018 Not reviewed this visit You Were Diagnosed With   
  
 Codes Comments Essential hypertension    -  Primary ICD-10-CM: I10 
ICD-9-CM: 401.9 Hypercholesteremia     ICD-10-CM: E78.00 ICD-9-CM: 272.0 Stage 3 chronic kidney disease     ICD-10-CM: N18.3 ICD-9-CM: 801. 3 Prostate cancer [facility])     ICD-10-CM: P15 ICD-9-CM: 953 Borderline hyperglycemia     ICD-10-CM: R73.9 ICD-9-CM: 790.29 Vitals BP Pulse Temp Resp Height(growth percentile) Weight(growth percentile) 120/77 (BP 1 Location: Left arm, BP Patient Position: Sitting) 89 97.8 °F (36.6 °C) (Oral) 16 6' 2\" (1.88 m) 243 lb 9.6 oz (110.5 kg) SpO2 BMI Smoking Status 96% 31.28 kg/m2 Never Smoker Vitals History BMI and BSA Data Body Mass Index Body Surface Area  
 31.28 kg/m 2 2.4 m 2 Preferred Pharmacy Pharmacy Name Phone [address] [facility] 892 [phone] Your Updated Medication List  
  
   
This list is accurate as of 8/3/18 11:53 AM.  Always use your most recent med list.  
  
  
  
  
 losartan-hydroCHLOROthiazide 50-12.5 mg per tablet Commonly known as:  HYZAAR Take 1 Tab by mouth daily. simvastatin 10 mg tablet Commonly known as:  ZOCOR Take 1 Tab by mouth nightly. We Performed the Following CBC WITH AUTOMATED DIFF [76530 CPT(R)] HEMOGLOBIN A1C WITH EAG [15759 CPT(R)] LIPID PANEL [63850 CPT(R)] METABOLIC PANEL, COMPREHENSIVE [03596 CPT(R)] PSA, DIAGNOSTIC (PROSTATE SPECIFIC AG) [id number] CPT(R)] Follow-up Instructions Return in about 6 months (around 2/3/2019) for HTN. Patient Instructions Body Mass Index: Care Instructions Your Care Instructions Body mass index (BMI) can help you see if your weight is raising your risk for health problems. It uses a formula to compare how much you weigh with how tall you are. · A BMI lower than 18.5 is considered underweight. · A BMI between 18.5 and 24.9 is considered healthy. · A BMI between 25 and 29.9 is considered overweight. A BMI of 30 or higher is considered obese. If your BMI is in the normal range, it means that you have a lower risk for weight-related health problems. If your BMI is in the overweight or obese range, you may be at increased risk for weight-related health problems, such as high blood pressure, heart disease, stroke, arthritis or joint pain, and diabetes. If your BMI is in the underweight range, you may be at increased risk for health problems such as fatigue, lower protection (immunity) against illness, muscle loss, bone loss, hair loss, and hormone problems. BMI is just one measure of your risk for weight-related health problems. You may be at higher risk for health problems if you are not active, you eat an unhealthy diet, or you drink too much alcohol or use tobacco products. Follow-up care is a key part of your treatment and safety. Be sure to make and go to all appointments, and call your doctor if you are having problems. It's also a good idea to know your test results and keep a list of the medicines you take. How can you care for yourself at home? · Practice healthy eating habits. This includes eating plenty of fruits, vegetables, whole grains, lean protein, and low-fat dairy. · If your doctor recommends it, get more exercise. Walking is a good choice. Bit by bit, increase the amount you walk every day. Try for at least 30 minutes on most days of the week. · Do not smoke. Smoking can increase your risk for health problems. If you need help quitting, talk to your doctor about stop-smoking programs and medicines. These can increase your chances of quitting for good. · Limit alcohol to 2 drinks a day for men and 1 drink a day for women. Too much alcohol can cause health problems. If you have a BMI higher than 25 · Your doctor may do other tests to check your risk for weight-related health problems. This may include measuring the distance around your waist. A waist measurement of more than 40 inches in men or 35 inches in women can increase the risk of weight-related health problems. · Talk with your doctor about steps you can take to stay healthy or improve your health. You may need to make lifestyle changes to lose weight and stay healthy, such as changing your diet and getting regular exercise. If you have a BMI lower than 18.5 · Your doctor may do other tests to check your risk for health problems. · Talk with your doctor about steps you can take to stay healthy or improve your health. You may need to make lifestyle changes to gain or maintain weight and stay healthy, such as getting more healthy foods in your diet and doing exercises to build muscle. Where can you learn more? Go to http://nilda-anastacio.info/. Enter S176 in the search box to learn more about \"Body Mass Index: Care Instructions. \" Current as of: October 13, 2016 Content Version: 11.4 © 6372-2766 Healthwise, [employer]. Care instructions adapted under license by [employer] (which disclaims liability or warranty for this information). If you have questions about a medical condition or this instruction, always ask your healthcare professional. [address] any warranty or liability for your use of this information. Introducing [facility]! Darielalyson Gibson introduces [employer] patient portal. Now you can access parts of your medical record, email your doctor's office, and request medication refills online. 1. In your internet browser, go to [email or web address] 2. Click on the First Time User? Click Here link in the Sign In box. You will see the New Member Sign Up page. 3. Enter your [employer] Access Code exactly as it appears below. You will not need to use this code after youve completed the sign-up process. If you do not sign up before the expiration date, you must request a new code. · [employer] Access Code: [email or web address] Expires: 10/5/2018  5:43 PM 
 
4. Enter the last four digits of your Social Security Number (xxxx) and Date of Birth (mm/dd/yyyy) as indicated and click Submit. You will be taken to the next sign-up page. 5. Create a [employer] ID. This will be your [employer] login ID and cannot be changed, so think of one that is secure and easy to remember. 6. Create a [employer] password. You can change your password at any time. 7. Enter your Password Reset Question and Answer. This can be used at a later time if you forget your password. 8. Enter your e-mail address. You will receive e-mail notification when new information is available in [address] 9. Click Sign Up. You can now view and download portions of your medical record. 10. Click the Download Summary menu link to download a portable copy of your medical information. If you have questions, please visit the Frequently Asked Questions section of the [employer] website. Remember, [employer] is NOT to be used for urgent needs. For medical emergencies, dial 911. Now available from your iPhone and Android! Please provide this summary of care documentation to your next provider. Your primary care clinician is listed as [place]. If you have any questions after today's visit, please call [phone].

## 2018-08-04 LAB
ALBUMIN SERPL-MCNC: 4 G/DL (ref 3.6–4.8)
ALBUMIN/GLOB SERPL: 1.2 {RATIO} (ref 1.2–2.2)
ALP SERPL-CCNC: 108 IU/L (ref 39–117)
ALT SERPL-CCNC: 28 IU/L (ref 0–44)
AST SERPL-CCNC: 25 IU/L (ref 0–40)
BASOPHILS # BLD AUTO: 0 X10E3/UL (ref 0–0.2)
BASOPHILS NFR BLD AUTO: 0 %
BILIRUB SERPL-MCNC: 0.9 MG/DL (ref 0–1.2)
BUN SERPL-MCNC: 14 MG/DL (ref 8–27)
BUN/CREAT SERPL: 10 (ref 10–24)
CALCIUM SERPL-MCNC: 9.4 MG/DL (ref 8.6–10.2)
CHLORIDE SERPL-SCNC: 98 MMOL/L (ref 96–106)
CHOLEST SERPL-MCNC: 154 MG/DL (ref 100–199)
CO2 SERPL-SCNC: 27 MMOL/L (ref 20–29)
CREAT SERPL-MCNC: 1.36 MG/DL (ref 0.76–1.27)
EOSINOPHIL # BLD AUTO: 0.1 X10E3/UL (ref 0–0.4)
EOSINOPHIL NFR BLD AUTO: 1 %
ERYTHROCYTE [DISTWIDTH] IN BLOOD BY AUTOMATED COUNT: 14.3 % (ref 12.3–15.4)
EST. AVERAGE GLUCOSE BLD GHB EST-MCNC: 131 MG/DL
GLOBULIN SER CALC-MCNC: 3.4 G/DL (ref 1.5–4.5)
GLUCOSE SERPL-MCNC: 75 MG/DL (ref 65–99)
HBA1C MFR BLD: 6.2 % (ref 4.8–5.6)
HCT VFR BLD AUTO: 44 % (ref 37.5–51)
HDLC SERPL-MCNC: 45 MG/DL
HGB BLD-MCNC: 15 G/DL (ref 13–17.7)
IMM GRANULOCYTES # BLD: 0 X10E3/UL (ref 0–0.1)
IMM GRANULOCYTES NFR BLD: 0 %
LDLC SERPL CALC-MCNC: 80 MG/DL (ref 0–99)
LYMPHOCYTES # BLD AUTO: 2.4 X10E3/UL (ref 0.7–3.1)
LYMPHOCYTES NFR BLD AUTO: 28 %
MCH RBC QN AUTO: 31.1 PG (ref 26.6–33)
MCHC RBC AUTO-ENTMCNC: 34.1 G/DL (ref 31.5–35.7)
MCV RBC AUTO: 91 FL (ref 79–97)
MONOCYTES # BLD AUTO: 0.3 X10E3/UL (ref 0.1–0.9)
MONOCYTES NFR BLD AUTO: 4 %
NEUTROPHILS # BLD AUTO: 5.6 X10E3/UL (ref 1.4–7)
NEUTROPHILS NFR BLD AUTO: 67 %
PLATELET # BLD AUTO: 184 X10E3/UL (ref 150–379)
POTASSIUM SERPL-SCNC: 4.3 MMOL/L (ref 3.5–5.2)
PROT SERPL-MCNC: 7.4 G/DL (ref 6–8.5)
PSA SERPL-MCNC: 11.1 NG/ML (ref 0–4)
RBC # BLD AUTO: 4.82 X10E6/UL (ref 4.14–5.8)
SODIUM SERPL-SCNC: 140 MMOL/L (ref 134–144)
TRIGL SERPL-MCNC: 143 MG/DL (ref 0–149)
VLDLC SERPL CALC-MCNC: 29 MG/DL (ref 5–40)
WBC # BLD AUTO: 8.5 X10E3/UL (ref 3.4–10.8)

## 2018-08-09 ENCOUNTER — TELEPHONE (OUTPATIENT)
Dept: INTERNAL MEDICINE CLINIC | Age: 63
End: 2018-08-09

## 2018-08-09 NOTE — TELEPHONE ENCOUNTER
Fax #[phone] pt asking that you fax his lab results to Dr. Tiffanie French as he has an appt with this physician tomorrow morning. Thanks.

## 2018-08-09 NOTE — TELEPHONE ENCOUNTER
Pt is requesting a call back in reference to him needing to  a copy of his blood work results for his appointment tomorrow.  Best contact number: [phone]       Message received & copied from [facility]

## 2018-08-10 NOTE — TELEPHONE ENCOUNTER
Patient states he needs a call back in reference to getting his Test/Lab results & discussing these over the phone.  Thank you

## 2018-08-10 NOTE — TELEPHONE ENCOUNTER
Really the main thing is the PSA. Also the elevated A1C would suggest \"pre-diabetes\"--exercise and healthy diet recommended.    BJF

## 2018-08-10 NOTE — TELEPHONE ENCOUNTER
Spoke with patient after 2 patient identifiers being note and advised per Dr. Babita Rucker advised Really the main thing is the PSA. Follow up with urologist.  Also the elevated A1C would suggest \"pre-diabetes\"--exercise and healthy diet recommended. BJF. Patient expressed understanding and has no further questions at this time.

## 2018-08-10 NOTE — TELEPHONE ENCOUNTER
Spoke with patient after 2 patient identifiers being note and advised that some of the labs were a little off, but I would ask MD to annotate ai would call him back. Patient expressed understanding and has no further questions at this time.

## 2018-08-15 DIAGNOSIS — E78.00 HYPERCHOLESTEREMIA: ICD-10-CM

## 2018-08-15 RX ORDER — SIMVASTATIN 10 MG/1
TABLET, FILM COATED ORAL
Qty: 30 TAB | Refills: 0 | Status: SHIPPED | OUTPATIENT
Start: 2018-08-15 | End: 2018-09-17 | Stop reason: SDUPTHER

## 2018-08-29 ENCOUNTER — HOSPITAL ENCOUNTER (OUTPATIENT)
Dept: NUCLEAR MEDICINE | Age: 63
Discharge: HOME | End: 2018-08-29
Attending: UROLOGY
Payer: COMMERCIAL

## 2018-08-29 DIAGNOSIS — R97.21 INCREASING PSA LEVEL AFTER TREATMENT FOR PROSTATE CANCER: ICD-10-CM

## 2018-08-29 DIAGNOSIS — N40.0 BENIGN ENLARGEMENT OF PROSTATE: ICD-10-CM

## 2018-08-29 PROCEDURE — 78306 BONE IMAGING WHOLE BODY: CPT

## 2018-09-17 DIAGNOSIS — E78.00 HYPERCHOLESTEREMIA: ICD-10-CM

## 2018-09-18 RX ORDER — SIMVASTATIN 10 MG/1
TABLET, FILM COATED ORAL
Qty: 30 TAB | Refills: 0 | Status: SHIPPED | OUTPATIENT
Start: 2018-09-18 | End: 2019-02-06 | Stop reason: SDUPTHER

## 2019-02-06 ENCOUNTER — OFFICE VISIT (OUTPATIENT)
Dept: INTERNAL MEDICINE CLINIC | Age: 64
End: 2019-02-06

## 2019-02-06 VITALS
OXYGEN SATURATION: 96 % | HEART RATE: 88 BPM | TEMPERATURE: 97.9 F | SYSTOLIC BLOOD PRESSURE: 109 MMHG | BODY MASS INDEX: 31.37 KG/M2 | DIASTOLIC BLOOD PRESSURE: 79 MMHG | WEIGHT: 244.4 LBS | HEIGHT: 74 IN | RESPIRATION RATE: 16 BRPM

## 2019-02-06 DIAGNOSIS — C61 PROSTATE CANCER (HCC): Primary | ICD-10-CM

## 2019-02-06 DIAGNOSIS — I10 ESSENTIAL HYPERTENSION: ICD-10-CM

## 2019-02-06 DIAGNOSIS — R73.9 BORDERLINE HYPERGLYCEMIA: ICD-10-CM

## 2019-02-06 DIAGNOSIS — N18.30 STAGE 3 CHRONIC KIDNEY DISEASE (HCC): ICD-10-CM

## 2019-02-06 DIAGNOSIS — D86.9 SARCOID: ICD-10-CM

## 2019-02-06 DIAGNOSIS — E78.00 HYPERCHOLESTEREMIA: ICD-10-CM

## 2019-02-06 RX ORDER — LOSARTAN POTASSIUM AND HYDROCHLOROTHIAZIDE 12.5; 5 MG/1; MG/1
1 TABLET ORAL DAILY
Qty: 30 TAB | Refills: 11 | Status: SHIPPED | OUTPATIENT
Start: 2019-02-06 | End: 2020-02-24

## 2019-02-06 RX ORDER — SIMVASTATIN 10 MG/1
TABLET, FILM COATED ORAL
Qty: 30 TAB | Refills: 11 | Status: SHIPPED | OUTPATIENT
Start: 2019-02-06 | End: 2020-03-06

## 2019-02-06 NOTE — PROGRESS NOTES
1. Have you been to the ER, urgent care clinic since your last visit? Hospitalized since your last visit? no  2. Have you seen or consulted any other health care providers outside of the [address] [place] [address] since your last visit? Include any pap smears or colon screening.  no

## 2019-02-08 LAB
ALBUMIN SERPL-MCNC: 4.3 G/DL (ref 3.6–4.8)
ALBUMIN/GLOB SERPL: 1.4 {RATIO} (ref 1.2–2.2)
ALP SERPL-CCNC: 114 IU/L (ref 39–117)
ALT SERPL-CCNC: 29 IU/L (ref 0–44)
AST SERPL-CCNC: 30 IU/L (ref 0–40)
BASOPHILS # BLD AUTO: 0 X10E3/UL (ref 0–0.2)
BASOPHILS NFR BLD AUTO: 0 %
BILIRUB SERPL-MCNC: 0.9 MG/DL (ref 0–1.2)
BUN SERPL-MCNC: 17 MG/DL (ref 8–27)
BUN/CREAT SERPL: 13 (ref 10–24)
CALCIUM SERPL-MCNC: 9.5 MG/DL (ref 8.6–10.2)
CHLORIDE SERPL-SCNC: 102 MMOL/L (ref 96–106)
CHOLEST SERPL-MCNC: 204 MG/DL (ref 100–199)
CO2 SERPL-SCNC: 24 MMOL/L (ref 20–29)
CREAT SERPL-MCNC: 1.29 MG/DL (ref 0.76–1.27)
EOSINOPHIL # BLD AUTO: 0.1 X10E3/UL (ref 0–0.4)
EOSINOPHIL NFR BLD AUTO: 1 %
ERYTHROCYTE [DISTWIDTH] IN BLOOD BY AUTOMATED COUNT: 14.2 % (ref 12.3–15.4)
EST. AVERAGE GLUCOSE BLD GHB EST-MCNC: 126 MG/DL
GLOBULIN SER CALC-MCNC: 3.1 G/DL (ref 1.5–4.5)
GLUCOSE SERPL-MCNC: 90 MG/DL (ref 65–99)
HBA1C MFR BLD: 6 % (ref 4.8–5.6)
HCT VFR BLD AUTO: 44.5 % (ref 37.5–51)
HDLC SERPL-MCNC: 44 MG/DL
HGB BLD-MCNC: 15.6 G/DL (ref 13–17.7)
IMM GRANULOCYTES # BLD AUTO: 0 X10E3/UL (ref 0–0.1)
IMM GRANULOCYTES NFR BLD AUTO: 0 %
LDLC SERPL CALC-MCNC: 132 MG/DL (ref 0–99)
LYMPHOCYTES # BLD AUTO: 2.9 X10E3/UL (ref 0.7–3.1)
LYMPHOCYTES NFR BLD AUTO: 39 %
MCH RBC QN AUTO: 32 PG (ref 26.6–33)
MCHC RBC AUTO-ENTMCNC: 35.1 G/DL (ref 31.5–35.7)
MCV RBC AUTO: 91 FL (ref 79–97)
MONOCYTES # BLD AUTO: 0.5 X10E3/UL (ref 0.1–0.9)
MONOCYTES NFR BLD AUTO: 6 %
NEUTROPHILS # BLD AUTO: 4 X10E3/UL (ref 1.4–7)
NEUTROPHILS NFR BLD AUTO: 54 %
PLATELET # BLD AUTO: 165 X10E3/UL (ref 150–379)
POTASSIUM SERPL-SCNC: 4.3 MMOL/L (ref 3.5–5.2)
PROT SERPL-MCNC: 7.4 G/DL (ref 6–8.5)
PSA SERPL-MCNC: 13.4 NG/ML (ref 0–4)
RBC # BLD AUTO: 4.87 X10E6/UL (ref 4.14–5.8)
SODIUM SERPL-SCNC: 139 MMOL/L (ref 134–144)
TRIGL SERPL-MCNC: 139 MG/DL (ref 0–149)
VLDLC SERPL CALC-MCNC: 28 MG/DL (ref 5–40)
WBC # BLD AUTO: 7.4 X10E3/UL (ref 3.4–10.8)

## 2019-02-11 ENCOUNTER — TELEPHONE (OUTPATIENT)
Dept: INTERNAL MEDICINE CLINIC | Age: 64
End: 2019-02-11

## 2019-03-06 ENCOUNTER — TELEPHONE (OUTPATIENT)
Dept: INTERNAL MEDICINE CLINIC | Age: 64
End: 2019-03-06

## 2019-03-08 NOTE — TELEPHONE ENCOUNTER
Pt returned call to the office (unsure of reason). Pt stated he would like to received a call by 3/7/19.  Best [phone]       Message received & copied from [facility] after closing on 3/7/19

## 2019-03-08 NOTE — TELEPHONE ENCOUNTER
Identified patient 2 identifiers verified. reviewed lab results with patient had questions about Hemoglobin A1c , encouraged low fat  Diet, watch carbs , white rice, potatoes, pastas. Patient is followed by Urology and has recent follow up.

## 2019-06-06 ENCOUNTER — HOSPITAL ENCOUNTER (OUTPATIENT)
Dept: PET IMAGING | Age: 64
Discharge: HOME | End: 2019-06-06
Attending: UROLOGY
Payer: COMMERCIAL

## 2019-06-06 VITALS — WEIGHT: 238 LBS | HEIGHT: 74 IN | BODY MASS INDEX: 30.54 KG/M2

## 2019-06-06 DIAGNOSIS — R97.21 INCREASING PROSTATE SPECIFIC ANTIGEN (PSA) LEVEL AFTER TREATMENT FOR MALIGNANT NEOPLASM OF PROSTATE: ICD-10-CM

## 2019-06-06 PROCEDURE — A9588 FLUCICLOVINE F-18: HCPCS

## 2019-06-06 RX ORDER — SODIUM CHLORIDE 0.9 % (FLUSH) 0.9 %
10 SYRINGE (ML) INJECTION
Status: COMPLETED | OUTPATIENT
Start: 2019-06-06 | End: 2019-06-06

## 2019-06-06 RX ADMIN — Medication 10 ML: at 14:34

## 2019-08-07 ENCOUNTER — OFFICE VISIT (OUTPATIENT)
Dept: INTERNAL MEDICINE CLINIC | Age: 64
End: 2019-08-07

## 2019-08-07 VITALS
DIASTOLIC BLOOD PRESSURE: 73 MMHG | HEIGHT: 74 IN | WEIGHT: 240 LBS | SYSTOLIC BLOOD PRESSURE: 115 MMHG | BODY MASS INDEX: 30.8 KG/M2 | TEMPERATURE: 97.5 F | RESPIRATION RATE: 16 BRPM | OXYGEN SATURATION: 95 % | HEART RATE: 74 BPM

## 2019-08-07 DIAGNOSIS — R73.9 BORDERLINE HYPERGLYCEMIA: ICD-10-CM

## 2019-08-07 DIAGNOSIS — E78.00 HYPERCHOLESTEREMIA: ICD-10-CM

## 2019-08-07 DIAGNOSIS — C61 PROSTATE CANCER (HCC): ICD-10-CM

## 2019-08-07 DIAGNOSIS — I10 ESSENTIAL HYPERTENSION: Primary | ICD-10-CM

## 2019-08-07 DIAGNOSIS — N18.30 STAGE 3 CHRONIC KIDNEY DISEASE (HCC): ICD-10-CM

## 2019-08-07 NOTE — PATIENT INSTRUCTIONS
Office Policies    Phone calls/patient messages:            Please allow up to 24 hours for someone in the office to contact you about your call or message. Be mindful your provider may be out of the office or your message may require further review. We encourage you to use [employer] for your messages as this is a faster, more efficient way to communicate with our office                         Medication Refills:            Prescription medications require 48-72 business hours to process. We encourage you to use [employer] for your refills. For controlled medications: Please allow 72 business hours to process. Certain medications may require you to  a written prescription at our office. NO narcotic/controlled medications will be prescribed after 4pm Monday through Friday or on weekends              Form/Paperwork Completion:            Please note a $25 fee may incur for all paperwork for completed by our providers. We ask that you allow 7-10 business days. Pre-payment is due prior to picking up/faxing the completed form. You may also download your forms to [employer] to have your doctor print off.

## 2019-08-07 NOTE — PROGRESS NOTES
SUBJECTIVE:   Mr. Violeta Moore is a 60 y.o. male who is here for follow up of routine medical issues. Previously he saw Dr. Torey Gomez, and before that Dr. Funmi Mahoney. Chief Complaint   Patient presents with    Hypertension     pt here today for routine f.u and fasting lab work      Back pain stable. TMJ hasn't been bothering him. Had colonoscopy and polypectomy with Dr. Trip Villagran in 2018. Prostate Cancer: He recently had bone scan and PET. Now sees a urologist at [facility]--Dr. Mehdi Weber; no longer sees Dr. Yanelis Garrido due to insurance; He has had prostate cancer; He completed XRT in 2014. He is no longer seeing Dr. Edwin Love, for CKD--\"as needed if the labs get worse. \"   He saw Dr. Daria De La Garza and had PFT, due to sarcoid. This issue is quiescent. He had diagnosis years ago, with liver biopsy. He gets yearly eye check. At this time, he is otherwise doing well and has brought no other complaints to my attention today. For a list of the medical issues addressed today, see the assessment and plan below. PMH:   Past Medical History:   Diagnosis Date    Arthritis     CKD (chronic kidney disease) 2014    Hypercholesterolemia     Hypertension     Prostate cancer ([facility] [address] 2014    Sarcoid        Past Surgical History:   Procedure Laterality Date    HX PROSTATECTOMY         All: He has No Known Allergies. Current Outpatient Medications   Medication Sig    simvastatin (ZOCOR) 10 mg tablet TAKE 1 TABLET BY MOUTH ONCE NIGHTLY    losartan-hydroCHLOROthiazide (HYZAAR) 50-12.5 mg per tablet Take 1 Tab by mouth daily. No current facility-administered medications for this visit. FH: His mother  68 of lung cancer, HTN. Father  of lung cancer age 68. One sister  age 1 of heart defect. Sister and brother are alive and well. 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  He has never used smokeless tobacco. He reports that he drinks alcohol. He reports that he does not use drugs. ROS: See above; Complete ROS otherwise negative. OBJECTIVE:   Vitals:   Visit Vitals  /73 (BP 1 Location: Left arm, BP Patient Position: Sitting)   Pulse 74   Temp 97.5 °F (36.4 °C) (Oral)   Resp 16   Ht 6' 2\" (1.88 m)   Wt 240 lb (108.9 kg)   SpO2 95%   BMI 30.81 kg/m²      Gen: Pleasant 60 y.o. W male in NAD. HEENT: PERRLA. EOMI. OP moist and pink. Neck: Supple. No LAD. HEART: RRR, No M/G/R.    LUNGS: CTAB No W/R. ABDOMEN: S, NT, ND, BS+. EXTREMITIES: Warm. No C/C/E.  MUSCULOSKELETAL: Normal ROM, muscle strength 5/5 all groups. NEURO: Alert and oriented x 3. Cranial nerves grossly intact. No focal sensory or motor deficits noted. SKIN: Warm. Dry. No rashes or other lesions noted. Lab Results   Component Value Date/Time    Sodium 139 02/07/2019 10:07 AM    Potassium 4.3 02/07/2019 10:07 AM    Chloride 102 02/07/2019 10:07 AM    CO2 24 02/07/2019 10:07 AM    Anion gap 8 08/06/2013 05:00 AM    Glucose 90 02/07/2019 10:07 AM    BUN 17 02/07/2019 10:07 AM    Creatinine 1.29 (H) 02/07/2019 10:07 AM    BUN/Creatinine ratio 13 02/07/2019 10:07 AM    GFR est AA 69 02/07/2019 10:07 AM    GFR est non-AA 59 (L) 02/07/2019 10:07 AM    Calcium 9.5 02/07/2019 10:07 AM    Bilirubin, total 0.9 02/07/2019 10:07 AM    ALT (SGPT) 29 02/07/2019 10:07 AM    AST (SGOT) 30 02/07/2019 10:07 AM    Alk.  phosphatase 114 02/07/2019 10:07 AM    Protein, total 7.4 02/07/2019 10:07 AM    Albumin 4.3 02/07/2019 10:07 AM    Globulin 5.5 (H) 07/31/2013 12:55 PM    A-G Ratio 1.4 02/07/2019 10:07 AM       Lab Results   Component Value Date/Time    Cholesterol, total 204 (H) 02/07/2019 10:07 AM    HDL Cholesterol 44 02/07/2019 10:07 AM    LDL,Direct 93 05/21/2014 12:26 PM    LDL, calculated 132 (H) 02/07/2019 10:07 AM    Triglyceride 139 02/07/2019 10:07 AM        Lab Results   Component Value Date/Time    Hemoglobin A1c 6.0 (H) 02/07/2019 10:07 AM       Lab Results   Component Value Date/Time    WBC 7.4 02/07/2019 10:07 AM    HGB 15.6 02/07/2019 10:07 AM    HCT 44.5 02/07/2019 10:07 AM    PLATELET 383 10/48/5335 10:07 AM    MCV 91 02/07/2019 10:07 AM         ASSESSMENT/ PLAN:   1. HTN (hypertension): BP fine today. 2. Hypercholesteremia: Last labs look normal. No changes. 3. CKD (chronic kidney disease), stage 3 (moderate): GFR as above. Mild. Dr. Gerry Valente cut him loose. 4. Prostate cancer: s/p XRT and previous follow up Dr. Birgit Souza. Now seeing urologist, Dr. Nara De Leon. 5. Knee pain, right:  Doing better. 6. Sarcoid: He has seen Dr. Betsy Xiao. 7. Hyperglycemia / prediabetes: Watch diet and exercise. 8. Colon cancer screening: Refer to Dr. Shant Lorenzo. 9. Muffled hearing:     I have reviewed the patient's medications and risks/side effects/benefits were discussed. Diagnosis(-es) explained to patient and questions answered. Literature provided where appropriate. Follow-up and Dispositions    · Return in about 6 months (around 2/7/2020) for HTN. Discussed the patient's BMI with him. The BMI follow up plan is as follows:     dietary management education, guidance, and counseling  encourage exercise  monitor weight  prescribed dietary intake    An After Visit Summary was printed and given to the patient.

## 2019-08-07 NOTE — PROGRESS NOTES
1. Have you been to the ER, urgent care clinic since your last visit? Hospitalized since your last visit?no    2. Have you seen or consulted any other health care providers outside of the [address] [place] [address] since your last visit? Include any pap smears or colon screening.  no

## 2019-08-08 LAB
ALBUMIN SERPL-MCNC: 4.2 G/DL (ref 3.6–4.8)
ALBUMIN/GLOB SERPL: 1.4 {RATIO} (ref 1.2–2.2)
ALP SERPL-CCNC: 102 IU/L (ref 39–117)
ALT SERPL-CCNC: 28 IU/L (ref 0–44)
AST SERPL-CCNC: 29 IU/L (ref 0–40)
BASOPHILS # BLD AUTO: 0 X10E3/UL (ref 0–0.2)
BASOPHILS NFR BLD AUTO: 1 %
BILIRUB SERPL-MCNC: 0.9 MG/DL (ref 0–1.2)
BUN SERPL-MCNC: 18 MG/DL (ref 8–27)
BUN/CREAT SERPL: 15 (ref 10–24)
CALCIUM SERPL-MCNC: 9.2 MG/DL (ref 8.6–10.2)
CHLORIDE SERPL-SCNC: 104 MMOL/L (ref 96–106)
CHOLEST SERPL-MCNC: 200 MG/DL (ref 100–199)
CO2 SERPL-SCNC: 22 MMOL/L (ref 20–29)
CREAT SERPL-MCNC: 1.18 MG/DL (ref 0.76–1.27)
EOSINOPHIL # BLD AUTO: 0.1 X10E3/UL (ref 0–0.4)
EOSINOPHIL NFR BLD AUTO: 1 %
ERYTHROCYTE [DISTWIDTH] IN BLOOD BY AUTOMATED COUNT: 13.2 % (ref 12.3–15.4)
EST. AVERAGE GLUCOSE BLD GHB EST-MCNC: 126 MG/DL
GLOBULIN SER CALC-MCNC: 3 G/DL (ref 1.5–4.5)
GLUCOSE SERPL-MCNC: 84 MG/DL (ref 65–99)
HBA1C MFR BLD: 6 % (ref 4.8–5.6)
HCT VFR BLD AUTO: 44.2 % (ref 37.5–51)
HDLC SERPL-MCNC: 48 MG/DL
HGB BLD-MCNC: 15.1 G/DL (ref 13–17.7)
IMM GRANULOCYTES # BLD AUTO: 0 X10E3/UL (ref 0–0.1)
IMM GRANULOCYTES NFR BLD AUTO: 0 %
LDLC SERPL CALC-MCNC: 126 MG/DL (ref 0–99)
LYMPHOCYTES # BLD AUTO: 2.3 X10E3/UL (ref 0.7–3.1)
LYMPHOCYTES NFR BLD AUTO: 28 %
MCH RBC QN AUTO: 31.3 PG (ref 26.6–33)
MCHC RBC AUTO-ENTMCNC: 34.2 G/DL (ref 31.5–35.7)
MCV RBC AUTO: 92 FL (ref 79–97)
MONOCYTES # BLD AUTO: 0.6 X10E3/UL (ref 0.1–0.9)
MONOCYTES NFR BLD AUTO: 7 %
NEUTROPHILS # BLD AUTO: 5.2 X10E3/UL (ref 1.4–7)
NEUTROPHILS NFR BLD AUTO: 63 %
PLATELET # BLD AUTO: 163 X10E3/UL (ref 150–450)
POTASSIUM SERPL-SCNC: 4.1 MMOL/L (ref 3.5–5.2)
PROT SERPL-MCNC: 7.2 G/DL (ref 6–8.5)
PSA SERPL-MCNC: 21.1 NG/ML (ref 0–4)
RBC # BLD AUTO: 4.82 X10E6/UL (ref 4.14–5.8)
REFLEX CRITERIA: ABNORMAL
SODIUM SERPL-SCNC: 140 MMOL/L (ref 134–144)
TRIGL SERPL-MCNC: 130 MG/DL (ref 0–149)
VLDLC SERPL CALC-MCNC: 26 MG/DL (ref 5–40)
WBC # BLD AUTO: 8.2 X10E3/UL (ref 3.4–10.8)

## 2019-08-13 ENCOUNTER — TELEPHONE (OUTPATIENT)
Dept: INTERNAL MEDICINE CLINIC | Age: 64
End: 2019-08-13

## 2019-08-13 NOTE — PROGRESS NOTES
Called, spoke with Pt. Two pt identifiers confirmed. Pt informed per Dr. Jinny Morales PSA has risen and all other labs are stable. Pt informed per Dr. Jinny Morales to follow up with his urologist.   Pt verbalized understanding of information discussed w/ no further questions at this time.

## 2019-11-07 ENCOUNTER — OFFICE VISIT (OUTPATIENT)
Dept: INTERNAL MEDICINE CLINIC | Age: 64
End: 2019-11-07

## 2019-11-07 VITALS
DIASTOLIC BLOOD PRESSURE: 72 MMHG | HEIGHT: 74 IN | TEMPERATURE: 97.5 F | OXYGEN SATURATION: 95 % | BODY MASS INDEX: 30.93 KG/M2 | SYSTOLIC BLOOD PRESSURE: 117 MMHG | RESPIRATION RATE: 16 BRPM | HEART RATE: 76 BPM | WEIGHT: 241 LBS

## 2019-11-07 DIAGNOSIS — C61 PROSTATE CANCER (HCC): ICD-10-CM

## 2019-11-07 DIAGNOSIS — R97.20 ELEVATED PSA: ICD-10-CM

## 2019-11-07 DIAGNOSIS — I10 ESSENTIAL HYPERTENSION: Primary | ICD-10-CM

## 2019-11-07 DIAGNOSIS — R73.9 BORDERLINE HYPERGLYCEMIA: ICD-10-CM

## 2019-11-07 NOTE — PROGRESS NOTES
1. Have you been to the ER, urgent care clinic since your last visit? Hospitalized since your last visit?no    2. Have you seen or consulted any other health care providers outside of the [address] [place] [address] since your last visit? Include any pap smears or colon screening.  no

## 2019-11-07 NOTE — PROGRESS NOTES
SUBJECTIVE:   Mr. José Miguel Earl is a 60 y.o. male who is here for follow up. Previously he saw Dr. Luis Alberto Bernal, and before that Dr. Richy Saini. Chief Complaint   Patient presents with    Hypertension     pt here today for routine f.u and fasting lab work     Back pain stable. TMJ hasn't been bothering him. Had colonoscopy and polypectomy with Dr. Cynthia Richards in 2018. Prostate Cancer: He recently had bone scan and PET. Now sees a urologist at [facility]--Dr. Rick Valente; no longer sees Dr. Ita Schultz due to insurance; He has had prostate cancer; He completed XRT in 2014. He is no longer seeing Dr. Dea Bland, for CKD--\"as needed if the labs get worse. \"   He saw Dr. Flynn Linda and had PFT, due to sarcoid. This issue is quiescent. He had diagnosis years ago, with liver biopsy. He gets yearly eye check. At this time, he is otherwise doing well and has brought no other complaints to my attention today. For a list of the medical issues addressed today, see the assessment and plan below. PMH:   Past Medical History:   Diagnosis Date    Arthritis     CKD (chronic kidney disease) 2014    Hypercholesterolemia     Hypertension     Prostate cancer ([facility] [address] 2014    Sarcoid        Past Surgical History:   Procedure Laterality Date    HX PROSTATECTOMY         All: He has No Known Allergies. Current Outpatient Medications   Medication Sig    simvastatin (ZOCOR) 10 mg tablet TAKE 1 TABLET BY MOUTH ONCE NIGHTLY    losartan-hydroCHLOROthiazide (HYZAAR) 50-12.5 mg per tablet Take 1 Tab by mouth daily. No current facility-administered medications for this visit. FH: His mother  68 of lung cancer, HTN. Father  of lung cancer age 68. One sister  age 1 of heart defect. Sister and brother are alive and well. 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  He has never used smokeless tobacco. He reports that he drinks alcohol. He reports that he does not use drugs. ROS: See above; Complete ROS otherwise negative. OBJECTIVE:   Vitals:   Visit Vitals  /72 (BP 1 Location: Left arm, BP Patient Position: Sitting)   Pulse 76   Temp 97.5 °F (36.4 °C) (Oral)   Resp 16   Ht 6' 2\" (1.88 m)   Wt 241 lb (109.3 kg)   SpO2 95%   BMI 30.94 kg/m²      Gen: Pleasant 60 y.o. W male in NAD. HEENT: PERRLA. EOMI. OP moist and pink. Neck: Supple. No LAD. HEART: RRR, No M/G/R.    LUNGS: CTAB No W/R. ABDOMEN: S, NT, ND, BS+. EXTREMITIES: Warm. No C/C/E.  MUSCULOSKELETAL: Normal ROM, muscle strength 5/5 all groups. NEURO: Alert and oriented x 3. Cranial nerves grossly intact. No focal sensory or motor deficits noted. SKIN: Warm. Dry. No rashes or other lesions noted. Lab Results   Component Value Date/Time    Sodium 140 08/07/2019 11:50 AM    Potassium 4.1 08/07/2019 11:50 AM    Chloride 104 08/07/2019 11:50 AM    CO2 22 08/07/2019 11:50 AM    Anion gap 8 08/06/2013 05:00 AM    Glucose 84 08/07/2019 11:50 AM    BUN 18 08/07/2019 11:50 AM    Creatinine 1.18 08/07/2019 11:50 AM    BUN/Creatinine ratio 15 08/07/2019 11:50 AM    GFR est AA 76 08/07/2019 11:50 AM    GFR est non-AA 66 08/07/2019 11:50 AM    Calcium 9.2 08/07/2019 11:50 AM    Bilirubin, total 0.9 08/07/2019 11:50 AM    ALT (SGPT) 28 08/07/2019 11:50 AM    AST (SGOT) 29 08/07/2019 11:50 AM    Alk.  phosphatase 102 08/07/2019 11:50 AM    Protein, total 7.2 08/07/2019 11:50 AM    Albumin 4.2 08/07/2019 11:50 AM    Globulin 5.5 (H) 07/31/2013 12:55 PM    A-G Ratio 1.4 08/07/2019 11:50 AM       Lab Results   Component Value Date/Time    Cholesterol, total 200 (H) 08/07/2019 11:50 AM    HDL Cholesterol 48 08/07/2019 11:50 AM    LDL,Direct 93 05/21/2014 12:26 PM    LDL, calculated 126 (H) 08/07/2019 11:50 AM    Triglyceride 130 08/07/2019 11:50 AM        Lab Results   Component Value Date/Time    Hemoglobin A1c 6.0 (H) 08/07/2019 11:50 AM       Lab Results   Component Value Date/Time    WBC 8.2 08/07/2019 11:50 AM    HGB 15.1 08/07/2019 11:50 AM    HCT 44.2 08/07/2019 11:50 AM    PLATELET 121 80/89/2535 11:50 AM    MCV 92 08/07/2019 11:50 AM         ASSESSMENT/ PLAN:   1. HTN (hypertension): BP fine today. 2. Hypercholesteremia: Last labs look normal. No changes. 3. CKD (chronic kidney disease), stage 3 (moderate): GFR as above--normal.  Mild. Dr. Katy Montes cut him loose. 4. Prostate cancer: s/p XRT and previous follow up Dr. Shun Fraser. Now seeing urologist, Dr. Angella Grullon. 5. Knee pain, right:  Doing better. 6. Sarcoid: He has seen Dr. Home Stanley. 7. Hyperglycemia / prediabetes: Watch diet and exercise. 8. Colon cancer screening: Refer to Dr. Hilario Das. 9. Muffled hearing:     I have reviewed the patient's medications and risks/side effects/benefits were discussed. Diagnosis(-es) explained to patient and questions answered. Literature provided where appropriate. Follow up in about 4 months. Discussed the patient's BMI with him. The BMI follow up plan is as follows:     dietary management education, guidance, and counseling  encourage exercise  monitor weight  prescribed dietary intake    An After Visit Summary was printed and given to the patient.

## 2019-11-08 LAB
ALBUMIN SERPL-MCNC: 4.2 G/DL (ref 3.6–4.8)
ALBUMIN/GLOB SERPL: 1.4 {RATIO} (ref 1.2–2.2)
ALP SERPL-CCNC: 112 IU/L (ref 39–117)
ALT SERPL-CCNC: 25 IU/L (ref 0–44)
AST SERPL-CCNC: 22 IU/L (ref 0–40)
BILIRUB SERPL-MCNC: 0.8 MG/DL (ref 0–1.2)
BUN SERPL-MCNC: 13 MG/DL (ref 8–27)
BUN/CREAT SERPL: 10 (ref 10–24)
CALCIUM SERPL-MCNC: 9.3 MG/DL (ref 8.6–10.2)
CHLORIDE SERPL-SCNC: 100 MMOL/L (ref 96–106)
CO2 SERPL-SCNC: 26 MMOL/L (ref 20–29)
CREAT SERPL-MCNC: 1.3 MG/DL (ref 0.76–1.27)
EST. AVERAGE GLUCOSE BLD GHB EST-MCNC: 123 MG/DL
GLOBULIN SER CALC-MCNC: 2.9 G/DL (ref 1.5–4.5)
GLUCOSE SERPL-MCNC: 91 MG/DL (ref 65–99)
HBA1C MFR BLD: 5.9 % (ref 4.8–5.6)
POTASSIUM SERPL-SCNC: 4.2 MMOL/L (ref 3.5–5.2)
PROT SERPL-MCNC: 7.1 G/DL (ref 6–8.5)
PSA SERPL-MCNC: 25 NG/ML (ref 0–4)
REFLEX CRITERIA: ABNORMAL
SODIUM SERPL-SCNC: 140 MMOL/L (ref 134–144)

## 2019-11-22 RX ORDER — LOSARTAN POTASSIUM 50 MG/1
50 TABLET ORAL DAILY
Qty: 30 TAB | Refills: 3 | Status: SHIPPED | OUTPATIENT
Start: 2019-11-22 | End: 2020-02-24 | Stop reason: SDUPTHER

## 2019-11-22 RX ORDER — HYDROCHLOROTHIAZIDE 12.5 MG/1
12.5 TABLET ORAL DAILY
Qty: 30 TAB | Refills: 3 | Status: SHIPPED | OUTPATIENT
Start: 2019-11-22 | End: 2020-02-24 | Stop reason: SDUPTHER

## 2020-02-24 ENCOUNTER — TELEPHONE (OUTPATIENT)
Dept: INTERNAL MEDICINE CLINIC | Age: 65
End: 2020-02-24

## 2020-02-24 DIAGNOSIS — I10 ESSENTIAL HYPERTENSION: ICD-10-CM

## 2020-02-24 RX ORDER — LOSARTAN POTASSIUM AND HYDROCHLOROTHIAZIDE 12.5; 5 MG/1; MG/1
TABLET ORAL
Qty: 30 TAB | Refills: 11 | Status: SHIPPED | OUTPATIENT
Start: 2020-02-24 | End: 2020-05-26

## 2020-02-24 NOTE — TELEPHONE ENCOUNTER
I returned call to pt, 2 pt identifiers verified: pt given lab results according to lab letter that our office mailed out to pt on 2/11/20. Pt informed letter will be re-mailed to him.

## 2020-02-24 NOTE — TELEPHONE ENCOUNTER
Patient states he needs a call back at [phone] to discuss his last Test/Lab results as he never heard back on them. Please call.  Thank you

## 2020-03-05 DIAGNOSIS — E78.00 HYPERCHOLESTEREMIA: ICD-10-CM

## 2020-03-06 RX ORDER — SIMVASTATIN 10 MG/1
TABLET, FILM COATED ORAL
Qty: 90 TAB | Refills: 3 | Status: SHIPPED | OUTPATIENT
Start: 2020-03-06 | End: 2020-03-09 | Stop reason: SDUPTHER

## 2020-03-09 ENCOUNTER — OFFICE VISIT (OUTPATIENT)
Dept: INTERNAL MEDICINE CLINIC | Age: 65
End: 2020-03-09

## 2020-03-09 VITALS
HEIGHT: 74 IN | DIASTOLIC BLOOD PRESSURE: 78 MMHG | SYSTOLIC BLOOD PRESSURE: 137 MMHG | BODY MASS INDEX: 29.9 KG/M2 | TEMPERATURE: 97.6 F | WEIGHT: 233 LBS | HEART RATE: 78 BPM | RESPIRATION RATE: 16 BRPM | OXYGEN SATURATION: 95 %

## 2020-03-09 DIAGNOSIS — C61 PROSTATE CANCER (HCC): ICD-10-CM

## 2020-03-09 DIAGNOSIS — I10 ESSENTIAL HYPERTENSION: Primary | ICD-10-CM

## 2020-03-09 DIAGNOSIS — R73.9 BORDERLINE HYPERGLYCEMIA: ICD-10-CM

## 2020-03-09 DIAGNOSIS — D86.9 SARCOID: ICD-10-CM

## 2020-03-09 DIAGNOSIS — E78.00 HYPERCHOLESTEREMIA: ICD-10-CM

## 2020-03-09 DIAGNOSIS — N18.30 STAGE 3 CHRONIC KIDNEY DISEASE (HCC): ICD-10-CM

## 2020-03-09 RX ORDER — SIMVASTATIN 10 MG/1
TABLET, FILM COATED ORAL
Qty: 90 TAB | Refills: 3 | Status: SHIPPED | OUTPATIENT
Start: 2020-03-09 | End: 2020-05-26 | Stop reason: SDUPTHER

## 2020-03-09 NOTE — PATIENT INSTRUCTIONS
Office Policies    Phone calls/patient messages:            Please allow up to 24 hours for someone in the office to contact you about your call or message. Be mindful your provider may be out of the office or your message may require further review. We encourage you to use [employer] for your messages as this is a faster, more efficient way to communicate with our office                         Medication Refills:            Prescription medications require 48-72 business hours to process. We encourage you to use [employer] for your refills. For controlled medications: Please allow 72 business hours to process. Certain medications may require you to  a written prescription at our office. NO narcotic/controlled medications will be prescribed after 4pm Monday through Friday or on weekends              Form/Paperwork Completion:            Please note a $25 fee may incur for all paperwork for completed by our providers. We ask that you allow 7-10 business days. Pre-payment is due prior to picking up/faxing the completed form. You may also download your forms to [employer] to have your doctor print off.      1. Have you been to the ER, urgent care clinic since your last visit? Hospitalized since your last visit? no    2. Have you seen or consulted any other health care providers outside of the [address] [place] [address] since your last visit? Include any pap smears or colon screening.  no

## 2020-03-09 NOTE — PROGRESS NOTES
SUBJECTIVE:   Mr. Che Bragg. is a 60 y.o. male who is here for follow up. Previously he saw Dr. Dee Dee Vergara, and before that Dr. Magdalena Barnett. Chief Complaint   Patient presents with    Hypertension     pt here today for a routine f.u and get fasting lab work      Back pain stable. TMJ hasn't been bothering him. Weight is down a bit:   Wt Readings from Last 3 Encounters:   20 233 lb (105.7 kg)   19 241 lb (109.3 kg)   19 240 lb (108.9 kg)     Had colonoscopy and polypectomy with Dr. Luis M Mejía in 2018. Prostate Cancer: He recently had bone scan and PET. Now sees a urologist at [facility]--Dr. Alexander Henderson; no longer sees Dr. Katy Leal due to insurance; He has had prostate cancer; He completed XRT in 2014. He is no longer seeing Dr. Avelina Rai, for CKD--\"as needed if the labs get worse. \"   He saw Dr. Kristyn Noriega and had PFT, due to sarcoid. This issue is quiescent. He had diagnosis years ago, with liver biopsy. He gets yearly eye check. At this time, he is otherwise doing well and has brought no other complaints to my attention today. For a list of the medical issues addressed today, see the assessment and plan below. PMH: Colon polypectomy 2018 Dr. Luis M Mejía. Past Medical History:   Diagnosis Date    Arthritis     CKD (chronic kidney disease) 2014    Hypercholesterolemia     Hypertension     Prostate cancer ([address] 2014    Sarcoid        Past Surgical History:   Procedure Laterality Date    HX PROSTATECTOMY         All: He has No Known Allergies. Current Outpatient Medications   Medication Sig    simvastatin (ZOCOR) 10 mg tablet TAKE 1 TABLET BY MOUTH NIGHTLY    losartan-hydroCHLOROthiazide (HYZAAR) 50-12.5 mg per tablet TAKE 1 TABLET BY MOUTH EVERY DAY     No current facility-administered medications for this visit. FH: His mother  68 of lung cancer, HTN. Father  of lung cancer age 68. One sister  age 1 of heart defect.   Sister and brother are alive and well. 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 He has never used smokeless tobacco. He reports current alcohol use. He reports that he does not use drugs. ROS: See above; Complete ROS otherwise negative. OBJECTIVE:   Vitals:   Visit Vitals  /78 (BP 1 Location: Left arm, BP Patient Position: Sitting)   Pulse 78   Temp 97.6 °F (36.4 °C) (Oral)   Resp 16   Ht 6' 2\" (1.88 m)   Wt 233 lb (105.7 kg)   SpO2 95%   BMI 29.92 kg/m²      Gen: Pleasant 60 y.o. W male in NAD. HEENT: PERRLA. EOMI. TM pearly bilaterally. OP moist and pink. Neck: Supple. No LAD. HEART: RRR, No M/G/R.    LUNGS: CTAB No W/R. ABDOMEN: S, NT, ND, BS+. EXTREMITIES: Warm. No C/C/E.  MUSCULOSKELETAL: Normal ROM, muscle strength 5/5 all groups. NEURO: Alert and oriented x 3. Cranial nerves grossly intact. No focal sensory or motor deficits noted. SKIN: Warm. Dry. No rashes or other lesions noted. Lab Results   Component Value Date/Time    Sodium 140 11/07/2019 11:07 AM    Potassium 4.2 11/07/2019 11:07 AM    Chloride 100 11/07/2019 11:07 AM    CO2 26 11/07/2019 11:07 AM    Anion gap 8 08/06/2013 05:00 AM    Glucose 91 11/07/2019 11:07 AM    BUN 13 11/07/2019 11:07 AM    Creatinine 1.30 (H) 11/07/2019 11:07 AM    BUN/Creatinine ratio 10 11/07/2019 11:07 AM    GFR est AA 68 11/07/2019 11:07 AM    GFR est non-AA 58 (L) 11/07/2019 11:07 AM    Calcium 9.3 11/07/2019 11:07 AM    Bilirubin, total 0.8 11/07/2019 11:07 AM    ALT (SGPT) 25 11/07/2019 11:07 AM    AST (SGOT) 22 11/07/2019 11:07 AM    Alk.  phosphatase 112 11/07/2019 11:07 AM    Protein, total 7.1 11/07/2019 11:07 AM    Albumin 4.2 11/07/2019 11:07 AM    Globulin 5.5 (H) 07/31/2013 12:55 PM    A-G Ratio 1.4 11/07/2019 11:07 AM       Lab Results   Component Value Date/Time    Cholesterol, total 200 (H) 08/07/2019 11:50 AM    HDL Cholesterol 48 08/07/2019 11:50 AM    LDL,Direct 93 05/21/2014 12:26 PM    LDL, calculated 126 (H) 08/07/2019 11:50 AM    Triglyceride 130 08/07/2019 11:50 AM        Lab Results   Component Value Date/Time    Hemoglobin A1c 5.9 (H) 11/07/2019 11:07 AM       Lab Results   Component Value Date/Time    WBC 8.2 08/07/2019 11:50 AM    HGB 15.1 08/07/2019 11:50 AM    HCT 44.2 08/07/2019 11:50 AM    PLATELET 039 24/66/7618 11:50 AM    MCV 92 08/07/2019 11:50 AM         ASSESSMENT/ PLAN:   1. HTN (hypertension): BP fine today. 2. Hypercholesteremia: Last labs look normal. No changes. 3. CKD (chronic kidney disease), stage 3 (moderate): GFR as above--normal.  Mild. Dr. Levi Almeida cut him loose. 4. Prostate cancer: s/p XRT and previous follow up Dr. Pieter Inman. Now seeing urologist, Dr. Ashley Turner. 5. Knee pain, right:  Doing better. 6. Sarcoid: He has seen Dr. Daniella West. 7. Hyperglycemia / prediabetes: Watch diet and exercise. 8. Colon cancer screening/polyps: Per Dr. Rajendra Encinas. I have reviewed the patient's medications and risks/side effects/benefits were discussed. Diagnosis(-es) explained to patient and questions answered. Literature provided where appropriate. Follow up in about 6 months. Discussed the patient's BMI with him. The BMI follow up plan is as follows:     dietary management education, guidance, and counseling  encourage exercise  monitor weight  prescribed dietary intake    An After Visit Summary was printed and given to the patient.

## 2020-03-10 LAB
ALBUMIN SERPL-MCNC: 4.2 G/DL (ref 3.8–4.8)
ALBUMIN/GLOB SERPL: 1.4 {RATIO} (ref 1.2–2.2)
ALP SERPL-CCNC: 125 IU/L (ref 39–117)
ALT SERPL-CCNC: 34 IU/L (ref 0–44)
AST SERPL-CCNC: 26 IU/L (ref 0–40)
BASOPHILS # BLD AUTO: 0 X10E3/UL (ref 0–0.2)
BASOPHILS NFR BLD AUTO: 0 %
BILIRUB SERPL-MCNC: 1.2 MG/DL (ref 0–1.2)
BUN SERPL-MCNC: 15 MG/DL (ref 8–27)
BUN/CREAT SERPL: 11 (ref 10–24)
CALCIUM SERPL-MCNC: 9.9 MG/DL (ref 8.6–10.2)
CHLORIDE SERPL-SCNC: 101 MMOL/L (ref 96–106)
CHOLEST SERPL-MCNC: 199 MG/DL (ref 100–199)
CO2 SERPL-SCNC: 28 MMOL/L (ref 20–29)
CREAT SERPL-MCNC: 1.32 MG/DL (ref 0.76–1.27)
EOSINOPHIL # BLD AUTO: 0.1 X10E3/UL (ref 0–0.4)
EOSINOPHIL NFR BLD AUTO: 2 %
ERYTHROCYTE [DISTWIDTH] IN BLOOD BY AUTOMATED COUNT: 13.2 % (ref 11.6–15.4)
EST. AVERAGE GLUCOSE BLD GHB EST-MCNC: 123 MG/DL
GLOBULIN SER CALC-MCNC: 3.1 G/DL (ref 1.5–4.5)
GLUCOSE SERPL-MCNC: 85 MG/DL (ref 65–99)
HBA1C MFR BLD: 5.9 % (ref 4.8–5.6)
HCT VFR BLD AUTO: 45 % (ref 37.5–51)
HDLC SERPL-MCNC: 45 MG/DL
HGB BLD-MCNC: 15.3 G/DL (ref 13–17.7)
IMM GRANULOCYTES # BLD AUTO: 0 X10E3/UL (ref 0–0.1)
IMM GRANULOCYTES NFR BLD AUTO: 0 %
LDLC SERPL CALC-MCNC: 130 MG/DL (ref 0–99)
LYMPHOCYTES # BLD AUTO: 2.2 X10E3/UL (ref 0.7–3.1)
LYMPHOCYTES NFR BLD AUTO: 27 %
MCH RBC QN AUTO: 31.1 PG (ref 26.6–33)
MCHC RBC AUTO-ENTMCNC: 34 G/DL (ref 31.5–35.7)
MCV RBC AUTO: 92 FL (ref 79–97)
MONOCYTES # BLD AUTO: 0.6 X10E3/UL (ref 0.1–0.9)
MONOCYTES NFR BLD AUTO: 8 %
NEUTROPHILS # BLD AUTO: 5.2 X10E3/UL (ref 1.4–7)
NEUTROPHILS NFR BLD AUTO: 63 %
PLATELET # BLD AUTO: 183 X10E3/UL (ref 150–450)
POTASSIUM SERPL-SCNC: 4.4 MMOL/L (ref 3.5–5.2)
PROT SERPL-MCNC: 7.3 G/DL (ref 6–8.5)
PSA SERPL-MCNC: 35.4 NG/ML (ref 0–4)
RBC # BLD AUTO: 4.92 X10E6/UL (ref 4.14–5.8)
REFLEX CRITERIA: ABNORMAL
SODIUM SERPL-SCNC: 144 MMOL/L (ref 134–144)
TRIGL SERPL-MCNC: 119 MG/DL (ref 0–149)
VLDLC SERPL CALC-MCNC: 24 MG/DL (ref 5–40)
WBC # BLD AUTO: 8.1 X10E3/UL (ref 3.4–10.8)

## 2020-04-20 ENCOUNTER — TELEPHONE (OUTPATIENT)
Dept: INTERNAL MEDICINE CLINIC | Age: 65
End: 2020-04-20

## 2020-04-20 NOTE — TELEPHONE ENCOUNTER
[facility]             General Message/Vendor Calls     Caller's first and last name:       Reason for call:   Last three lab results     Callback required yes/no and why:   Yes,to let him know if the lab orders can be faxed to his urologist [phone].      Best contact number(s):   [phone]     Details to clarify the request:       John Cedeno

## 2020-04-21 NOTE — TELEPHONE ENCOUNTER
Called, spoke with Pt. Two pt identifiers confirmed. Pt informed of labs faxed to Urologist w/ confirmation received. Pt verbalized understanding of information discussed w/ no further questions at this time.

## 2020-05-07 ENCOUNTER — TELEPHONE (OUTPATIENT)
Dept: INTERNAL MEDICINE CLINIC | Age: 65
End: 2020-05-07

## 2020-05-07 DIAGNOSIS — I10 ESSENTIAL HYPERTENSION: Primary | ICD-10-CM

## 2020-05-08 RX ORDER — LOSARTAN POTASSIUM 50 MG/1
50 TABLET ORAL DAILY
Qty: 90 TAB | Refills: 1 | Status: SHIPPED | OUTPATIENT
Start: 2020-05-08 | End: 2020-05-26 | Stop reason: SDUPTHER

## 2020-05-08 NOTE — TELEPHONE ENCOUNTER
Okay, let's separate the losartan and the HCT.  BJF
Per verbal read back Losartan and Hyzaar were  and escribed to [facility].
Today our office received a fax from [address] stating that losartan-hctz is on back order, asking that you change or prescribe separately.  Please e-scribe
Cooperative/Alert/Awake

## 2020-05-12 DIAGNOSIS — I10 ESSENTIAL HYPERTENSION: ICD-10-CM

## 2020-05-12 RX ORDER — LOSARTAN POTASSIUM 50 MG/1
50 TABLET ORAL DAILY
Qty: 90 TAB | Refills: 1 | Status: CANCELLED | OUTPATIENT
Start: 2020-05-12

## 2020-05-12 RX ORDER — LOSARTAN POTASSIUM AND HYDROCHLOROTHIAZIDE 12.5; 5 MG/1; MG/1
TABLET ORAL
Qty: 30 TAB | Refills: 11 | Status: CANCELLED | OUTPATIENT
Start: 2020-05-12

## 2020-05-12 NOTE — TELEPHONE ENCOUNTER
Guido, [address] [facility] Office Pool             Medication Refill     Caller (if not patient):       Relationship of caller (if not patient):       Best contact number(s):[phone]       Name of medication and dosage if known: losartan-hydroCHLOROthiazide (HYZAAR) 50-12.5 mg per tablet       Is patient out of this medication (yes/no): Yes       Pharmacy name: [facility], [address] [facility], [address] [facility], [address] [facility]     Pharmacy listed in chart? (yes/no):   Pharmacy phone [phone]       Details to clarify the request: pt requesting to change primary pharmacy to above location for future rx fill request.       Jacques Arzola

## 2020-06-05 ENCOUNTER — TELEPHONE (OUTPATIENT)
Dept: INTERNAL MEDICINE CLINIC | Age: 65
End: 2020-06-05

## 2020-06-05 RX ORDER — TELMISARTAN 40 MG/1
40 TABLET ORAL DAILY
Qty: 30 TAB | Refills: 5 | Status: SHIPPED | OUTPATIENT
Start: 2020-06-05 | End: 2020-06-29

## 2020-06-05 NOTE — TELEPHONE ENCOUNTER
Today our office received a fax from [facility] stating that losartan potassium is on backorder and requesting an alternative.

## 2020-06-09 DIAGNOSIS — I10 ESSENTIAL HYPERTENSION: ICD-10-CM

## 2020-06-09 RX ORDER — LOSARTAN POTASSIUM 50 MG/1
50 TABLET ORAL DAILY
Qty: 90 TAB | Refills: 1 | Status: SHIPPED | OUTPATIENT
Start: 2020-06-09 | End: 2020-06-29

## 2020-06-09 NOTE — TELEPHONE ENCOUNTER
Identified patient 2 identifiers verified.   Spoke with patient , he reports that [facility] Briones has the Losartan there and he would like to stay with  The Losartan

## 2020-06-29 ENCOUNTER — TELEPHONE (OUTPATIENT)
Dept: INTERNAL MEDICINE CLINIC | Age: 65
End: 2020-06-29

## 2020-06-29 DIAGNOSIS — E55.9 VITAMIN D DEFICIENCY: ICD-10-CM

## 2020-06-29 DIAGNOSIS — R73.9 BORDERLINE HYPERGLYCEMIA: ICD-10-CM

## 2020-06-29 DIAGNOSIS — I10 ESSENTIAL HYPERTENSION: Primary | ICD-10-CM

## 2020-06-29 DIAGNOSIS — C61 PROSTATE CANCER (HCC): ICD-10-CM

## 2020-06-29 DIAGNOSIS — E78.00 HYPERCHOLESTEREMIA: ICD-10-CM

## 2020-06-29 DIAGNOSIS — N18.30 STAGE 3 CHRONIC KIDNEY DISEASE (HCC): ICD-10-CM

## 2020-06-29 RX ORDER — TELMISARTAN AND HYDROCHLORTHIAZIDE 40; 12.5 MG/1; MG/1
1 TABLET ORAL DAILY
Qty: 30 TAB | Refills: 11 | Status: SHIPPED | OUTPATIENT
Start: 2020-06-29 | End: 2020-07-02 | Stop reason: SDUPTHER

## 2020-06-29 NOTE — TELEPHONE ENCOUNTER
----- Message from Kevin Schwartz sent at 2020 11:29 AM EDT -----  Regarding: Dr. Phil Wu  General/Vendor Call    :[birth year]    Caller: n/a    Reason: requesting to know will it hurt him to take 5000 mg of vitamin d instead of 2000 mg    Phone: [phone]    Details: n/a      Copy/paste envera

## 2020-06-29 NOTE — TELEPHONE ENCOUNTER
Alexey//[facility] states he needs a call back in reference to medication prescribed for patient, Telmisartan (MICARDIS) 40 mg tablet per patient is Incorrect. Patient advised that he is taking a different component/dosage of this medication. Please call pharmacist to discuss.  Thank you

## 2020-07-01 ENCOUNTER — TELEPHONE (OUTPATIENT)
Dept: INTERNAL MEDICINE CLINIC | Age: 65
End: 2020-07-01

## 2020-07-01 NOTE — TELEPHONE ENCOUNTER
Called, spoke with Pt. Two pt identifiers confirmed. Pt informed ok per Dr. Vidhi Monroe to take 5000mg of Vitamin D and Dr. Vidhi Monroe will check labs at next appt. Pt verbalized understanding of information discussed w/ no further questions at this time.

## 2020-07-02 ENCOUNTER — HOSPITAL ENCOUNTER (OUTPATIENT)
Dept: NUCLEAR MEDICINE | Age: 65
Discharge: HOME | End: 2020-07-02
Attending: UROLOGY
Payer: COMMERCIAL

## 2020-07-02 ENCOUNTER — TELEPHONE (OUTPATIENT)
Dept: INTERNAL MEDICINE CLINIC | Age: 65
End: 2020-07-02

## 2020-07-02 ENCOUNTER — HOSPITAL ENCOUNTER (OUTPATIENT)
Dept: CT IMAGING | Age: 65
Discharge: HOME | End: 2020-07-02
Attending: UROLOGY
Payer: COMMERCIAL

## 2020-07-02 DIAGNOSIS — R97.21 INCREASING PROSTATE SPECIFIC ANTIGEN (PSA) LEVEL AFTER TREATMENT FOR MALIGNANT NEOPLASM OF PROSTATE: ICD-10-CM

## 2020-07-02 DIAGNOSIS — C61 MALIGNANT NEOPLASM OF PROSTATE (HCC): ICD-10-CM

## 2020-07-02 PROCEDURE — 74177 CT ABD & PELVIS W/CONTRAST: CPT

## 2020-07-02 PROCEDURE — 74011636320 HC RX REV CODE- 636/320: Performed by: UROLOGY

## 2020-07-02 PROCEDURE — 78306 BONE IMAGING WHOLE BODY: CPT

## 2020-07-02 RX ORDER — TELMISARTAN AND HYDROCHLORTHIAZIDE 40; 12.5 MG/1; MG/1
1 TABLET ORAL DAILY
Qty: 90 TAB | Refills: 3 | Status: SHIPPED | OUTPATIENT
Start: 2020-07-02 | End: 2020-11-13

## 2020-07-02 RX ORDER — SODIUM CHLORIDE 0.9 % (FLUSH) 0.9 %
10 SYRINGE (ML) INJECTION
Status: COMPLETED | OUTPATIENT
Start: 2020-07-02 | End: 2020-07-02

## 2020-07-02 RX ADMIN — IOHEXOL 40 ML: 240 INJECTION, SOLUTION INTRATHECAL; INTRAVASCULAR; INTRAVENOUS; ORAL at 12:27

## 2020-07-02 RX ADMIN — Medication 10 ML: at 12:27

## 2020-07-02 RX ADMIN — IOPAMIDOL 100 ML: 755 INJECTION, SOLUTION INTRAVENOUS at 12:27

## 2020-07-02 NOTE — TELEPHONE ENCOUNTER
[facility] states he needs a call back to make medication change for patient as previous Medication,Telmisartan-hydroCHLOROthiazide (MICARDIS HCT) 40-12.5 mg per tablet, is Not Covered by [employer]. Please call to discuss Alternative that are covered.  Thank you

## 2020-07-02 NOTE — TELEPHONE ENCOUNTER
Spoke with Alexey from [facility], patient confused with what is covered. Irbesartan HCTZ 150mg, 12.5 mg equivalent to Telmisartan-HCTZ. Alexey given okay per verbal read back from Dr. Sherif Reynolds. Telmisartan not filled, not covered by insurance.

## 2020-07-07 LAB
25(OH)D3+25(OH)D2 SERPL-MCNC: 39.2 NG/ML (ref 30–100)
ALBUMIN SERPL-MCNC: 4.1 G/DL (ref 3.8–4.8)
ALBUMIN/GLOB SERPL: 1.3 {RATIO} (ref 1.2–2.2)
ALP SERPL-CCNC: 109 IU/L (ref 39–117)
ALT SERPL-CCNC: 41 IU/L (ref 0–44)
AST SERPL-CCNC: 30 IU/L (ref 0–40)
BASOPHILS # BLD AUTO: 0 X10E3/UL (ref 0–0.2)
BASOPHILS NFR BLD AUTO: 0 %
BILIRUB SERPL-MCNC: 0.8 MG/DL (ref 0–1.2)
BUN SERPL-MCNC: 17 MG/DL (ref 8–27)
BUN/CREAT SERPL: 13 (ref 10–24)
CALCIUM SERPL-MCNC: 9.3 MG/DL (ref 8.6–10.2)
CHLORIDE SERPL-SCNC: 102 MMOL/L (ref 96–106)
CHOLEST SERPL-MCNC: 180 MG/DL (ref 100–199)
CO2 SERPL-SCNC: 24 MMOL/L (ref 20–29)
CREAT SERPL-MCNC: 1.29 MG/DL (ref 0.76–1.27)
EOSINOPHIL # BLD AUTO: 0.1 X10E3/UL (ref 0–0.4)
EOSINOPHIL NFR BLD AUTO: 1 %
ERYTHROCYTE [DISTWIDTH] IN BLOOD BY AUTOMATED COUNT: 13.4 % (ref 11.6–15.4)
EST. AVERAGE GLUCOSE BLD GHB EST-MCNC: 126 MG/DL
GLOBULIN SER CALC-MCNC: 3.1 G/DL (ref 1.5–4.5)
GLUCOSE SERPL-MCNC: 96 MG/DL (ref 65–99)
HBA1C MFR BLD: 6 % (ref 4.8–5.6)
HCT VFR BLD AUTO: 45.4 % (ref 37.5–51)
HDLC SERPL-MCNC: 49 MG/DL
HGB BLD-MCNC: 15.2 G/DL (ref 13–17.7)
IMM GRANULOCYTES # BLD AUTO: 0 X10E3/UL (ref 0–0.1)
IMM GRANULOCYTES NFR BLD AUTO: 0 %
LDLC SERPL CALC-MCNC: 89 MG/DL (ref 0–99)
LYMPHOCYTES # BLD AUTO: 2.1 X10E3/UL (ref 0.7–3.1)
LYMPHOCYTES NFR BLD AUTO: 27 %
MCH RBC QN AUTO: 31.1 PG (ref 26.6–33)
MCHC RBC AUTO-ENTMCNC: 33.5 G/DL (ref 31.5–35.7)
MCV RBC AUTO: 93 FL (ref 79–97)
MONOCYTES # BLD AUTO: 0.7 X10E3/UL (ref 0.1–0.9)
MONOCYTES NFR BLD AUTO: 8 %
NEUTROPHILS # BLD AUTO: 5 X10E3/UL (ref 1.4–7)
NEUTROPHILS NFR BLD AUTO: 64 %
PLATELET # BLD AUTO: 148 X10E3/UL (ref 150–450)
POTASSIUM SERPL-SCNC: 4.1 MMOL/L (ref 3.5–5.2)
PROT SERPL-MCNC: 7.2 G/DL (ref 6–8.5)
PSA SERPL-MCNC: 29.8 NG/ML (ref 0–4)
RBC # BLD AUTO: 4.89 X10E6/UL (ref 4.14–5.8)
SODIUM SERPL-SCNC: 139 MMOL/L (ref 134–144)
TRIGL SERPL-MCNC: 212 MG/DL (ref 0–149)
VLDLC SERPL CALC-MCNC: 42 MG/DL (ref 5–40)
WBC # BLD AUTO: 7.9 X10E3/UL (ref 3.4–10.8)

## 2020-07-09 ENCOUNTER — TELEPHONE (OUTPATIENT)
Dept: INTERNAL MEDICINE CLINIC | Age: 65
End: 2020-07-09

## 2020-07-17 DIAGNOSIS — C61 PROSTATE CANCER (HCC): ICD-10-CM

## 2020-07-17 DIAGNOSIS — R97.21 INCREASING PROSTATE SPECIFIC ANTIGEN (PSA) LEVEL AFTER TREATMENT FOR MALIGNANT NEOPLASM OF PROSTATE: ICD-10-CM

## 2020-07-17 RX ORDER — LOSARTAN POTASSIUM AND HYDROCHLOROTHIAZIDE 12.5; 5 MG/1; MG/1
TABLET ORAL
COMMUNITY
Start: 2020-05-26 | End: 2020-09-01 | Stop reason: SDUPTHER

## 2020-08-18 ENCOUNTER — TELEPHONE (OUTPATIENT)
Dept: INTERNAL MEDICINE CLINIC | Age: 65
End: 2020-08-18

## 2020-08-18 NOTE — TELEPHONE ENCOUNTER
Today our office received a notice from [id number], stating that losartan 50/12.5 mg is on back order, asking that you prescribe an alternative.

## 2020-08-19 NOTE — TELEPHONE ENCOUNTER
I called and spoke to pharmacy who states that he was not sure why our office got that request, stating that pt just got medication filled. Pharmacist states that will reach out when pt needs another refill.

## 2020-09-01 RX ORDER — LOSARTAN POTASSIUM AND HYDROCHLOROTHIAZIDE 12.5; 5 MG/1; MG/1
1 TABLET ORAL DAILY
Qty: 90 TAB | Refills: 3 | Status: SHIPPED | OUTPATIENT
Start: 2020-09-01 | End: 2020-09-11 | Stop reason: SDUPTHER

## 2020-09-08 ENCOUNTER — TELEPHONE (OUTPATIENT)
Dept: UROLOGY | Age: 65
End: 2020-09-08

## 2020-09-08 NOTE — TELEPHONE ENCOUNTER
I spoke to  Melissa José Miguel today who expressed frustration with his care. He feels that has had many scans and tests with no evidence of active disease. He has discussed his case with his brother, who is a radiologist and agrees that based on his scans, treatment is not warranted. He feels that he would like to seek a second opinion regarding his prostate cancer diagnosis.

## 2020-09-09 LAB
PSA SERPL-MCNC: 31 NG/ML (ref 0–4)
REFLEX CRITERIA: ABNORMAL

## 2020-09-09 NOTE — PROGRESS NOTES
You may inform him of result--he should follow up with his urologist. Also, he is due for a visit for routine follow up.

## 2020-09-11 ENCOUNTER — TELEPHONE (OUTPATIENT)
Dept: INTERNAL MEDICINE CLINIC | Age: 65
End: 2020-09-11

## 2020-09-11 NOTE — TELEPHONE ENCOUNTER
[facility]. [facility]               Caller's first and last name and relationship (if not the patient): self   Best contact number(s): [phone]   Whose call is being returned: Returning call from practice to set up for VV call today at 2:15pm.   Details to clarify the request: Pt will be ready to attend the appt.       Copy/paste Olvin gregg

## 2020-09-14 ENCOUNTER — TELEPHONE (OUTPATIENT)
Dept: UROLOGY | Age: 65
End: 2020-09-14

## 2020-09-14 NOTE — TELEPHONE ENCOUNTER
[facility] called requesting all notes and labs from 2019 and 2020. ... im not sure if you print and send records or if it goes to someone     Their fax number is [phone]

## 2020-09-15 NOTE — PROGRESS NOTES
Lab results reviewed with patient per Angelina Oliveros MD. Patient given an opportunity to ask questions, repeated information, and verbalized understanding.

## 2020-10-08 ENCOUNTER — HOSPITAL ENCOUNTER (OUTPATIENT)
Dept: PET IMAGING | Age: 65
Discharge: HOME | End: 2020-10-08
Attending: UROLOGY
Payer: COMMERCIAL

## 2020-10-08 VITALS — HEIGHT: 74 IN | WEIGHT: 248 LBS | BODY MASS INDEX: 31.83 KG/M2

## 2020-10-08 DIAGNOSIS — C61 PROSTATE CA (HCC): ICD-10-CM

## 2020-10-08 DIAGNOSIS — R97.21 INCREASING PROSTATE SPECIFIC ANTIGEN (PSA) LEVEL AFTER TREATMENT FOR MALIGNANT NEOPLASM OF PROSTATE: ICD-10-CM

## 2020-10-08 PROCEDURE — 78815 PET IMAGE W/CT SKULL-THIGH: CPT

## 2020-10-08 RX ORDER — SODIUM CHLORIDE 0.9 % (FLUSH) 0.9 %
10 SYRINGE (ML) INJECTION
Status: COMPLETED | OUTPATIENT
Start: 2020-10-08 | End: 2020-10-08

## 2020-10-08 RX ADMIN — Medication 10 ML: at 13:43

## 2020-11-13 ENCOUNTER — TELEPHONE (OUTPATIENT)
Dept: INTERNAL MEDICINE CLINIC | Age: 65
End: 2020-11-13

## 2020-11-13 ENCOUNTER — OFFICE VISIT (OUTPATIENT)
Dept: URGENT CARE | Age: 65
End: 2020-11-13
Payer: COMMERCIAL

## 2020-11-13 VITALS
WEIGHT: 248 LBS | OXYGEN SATURATION: 95 % | RESPIRATION RATE: 17 BRPM | BODY MASS INDEX: 31.84 KG/M2 | TEMPERATURE: 98 F | HEART RATE: 91 BPM

## 2020-11-13 DIAGNOSIS — L04.9 ACUTE LYMPHADENITIS: Primary | ICD-10-CM

## 2020-11-13 PROCEDURE — 99213 OFFICE O/P EST LOW 20 MIN: CPT | Performed by: FAMILY MEDICINE

## 2020-11-13 RX ORDER — CLINDAMYCIN HYDROCHLORIDE 300 MG/1
300 CAPSULE ORAL 3 TIMES DAILY
Qty: 21 CAP | Refills: 0 | Status: SHIPPED | OUTPATIENT
Start: 2020-11-13 | End: 2020-11-20

## 2020-11-13 NOTE — LETTER
November 13, 2020 [address] [place] [address] [facility] [address] Dear Ann-Marie Hudson: Thank you for requesting access to [employer]. Please follow the instructions below to securely access and download your online medical record. [employer] allows you to send messages to your doctor, view your test results, renew your prescriptions, schedule appointments, and more. How Do I Sign Up? 1. In your internet browser, go to [email or web address]. 2. Click on the First Time User? Click Here link in the Sign In box. You will see the New Member Sign Up page. 3. Enter your [employer] Access Code exactly as it appears below. You will not need to use this code after youve completed the sign-up process. If you do not sign up before the expiration date, you must request a new code. [employer] Access Code: [email or web address] Expires: 12/28/2020  9:48 AM  
 
4. Enter the last four digits of your Social Security Number (xxxx) and Date of Birth (mm/dd/yyyy) as indicated and click Submit. You will be taken to the next sign-up page. 5. Create a [employer] ID. This will be your [employer] login ID and cannot be changed, so think of one that is secure and easy to remember. 6. Create a [employer] password. You can change your password at any time. 7. Enter your Password Reset Question and Answer. This can be used at a later time if you forget your password. 8. Enter your e-mail address. You will receive e-mail notification when new information is available in [address] 9. Click Sign Up. You can now view and download portions of your medical record. 10. Click the Download Summary menu link to download a portable copy of your medical information. Additional Information If you have questions, please visit the Frequently Asked Questions section of the [employer] website at [email or web address] Remember, [employer] is NOT to be used for urgent needs.  For medical emergencies, dial 911. Now available from your iPhone and Android! Sincerely, [employer]

## 2020-11-13 NOTE — TELEPHONE ENCOUNTER
----- Message from Gayathri Romano sent at 11/13/2020  8:09 AM EST -----  Regarding: Dr Renee Ruano  Appointment not available    Caller's first and last name and relationship to patient (if not the patient): N/A      Best contact number:[phone]      Preferred date and time: ASAP      Scheduled appointment date and time: none      Reason for appointment: Swollen Lymph Nodes      Details to clarify the request: Pt has been experiencing swollen lymph nodes in his neck.        Message from [facility]

## 2020-11-13 NOTE — PROGRESS NOTES
This patient was seen in [facility] at [address] [place] [address] Urgent Care while in their vehicle due to COVID-19 pandemic with PPE and focused examination in order to decrease community viral transmission. The patient/guardian gave verbal consent to treat. Nuzhat Turcios. is a 63 y.o. male who presents with right neck LN swelling and pain along with slight ST. No known COVID-19 contacts. Denies cough, fever, SOB. The history is provided by the patient. Past Medical History:   Diagnosis Date    Arthritis     CKD (chronic kidney disease) 5/21/2014    Hypercholesterolemia     Hypertension     Increasing prostate specific antigen (PSA) level after treatment for malignant neoplasm of prostate 7/17/2020    After XRT his PSA went from 6.1 to 1.3. It stayed below 3 until 1/4/17. His PSA was increasingto 4.0 2/7/17. Rebiopsy of his prostate 2/17/17 revealed benign prostate tissue. 35cc gland. PSA 11.1 on 8/3/18. Repeat biopsy 9/18/18 revealed benign tissue. PSA 9/4/18 was 11.7. PSA 5/22/19 was 16.2 with 6.4% free. 6/6/19 Axumin PET did not reveal active disease. PSA 3/9/2020: 35.4 per PCP labs.  PSA 6/3    Prostate cancer ([address] 5/21/2014    Sarcoid         Past Surgical History:   Procedure Laterality Date    HX CIRCUMCISION      HX HERNIA REPAIR      HX KNEE ARTHROSCOPY      HX PROSTATECTOMY      HX TONSILLECTOMY           Family History   Problem Relation Age of Onset    Cancer Mother         Lung Cancer and Brain    Hypertension Mother     Cancer Father 68        Lung Cancer        Social History     Socioeconomic History    Marital status:      Spouse name: Not on file    Number of children: Not on file    Years of education: Not on file    Highest education level: Not on file   Occupational History    Not on file   Social Needs    Financial resource strain: Not on file    Food insecurity     Worry: Not on file     Inability: Not on file   Lover.ly needs Medical: Not on file     Non-medical: Not on file   Tobacco Use    Smoking status: Former Smoker     Years: 1.00    Smokeless tobacco: Never Used   Substance and Sexual Activity    Alcohol use: Yes    Drug use: No    Sexual activity: Yes     Partners: Female   Lifestyle    Physical activity     Days per week: Not on file     Minutes per session: Not on file    Stress: Not on file   Relationships    Social connections     Talks on phone: Not on file     Gets together: Not on file     Attends Evangelical service: Not on file     Active member of club or organization: Not on file     Attends meetings of clubs or organizations: Not on file     Relationship status: Not on file    Intimate partner violence     Fear of current or ex partner: Not on file     Emotionally abused: Not on file     Physically abused: Not on file     Forced sexual activity: Not on file   Other Topics Concern    Not on file   Social History Narrative    Not on file                ALLERGIES: Patient has no known allergies. Review of Systems   Constitutional: Negative for activity change, appetite change, chills and fever. HENT: Positive for sore throat. Negative for congestion and rhinorrhea. Respiratory: Negative for cough, shortness of breath and wheezing. Cardiovascular: Negative for chest pain. Gastrointestinal: Negative for abdominal pain, diarrhea, nausea and vomiting. Musculoskeletal: Negative for myalgias. Neurological: Negative for headaches. Hematological: Positive for adenopathy. Vitals:    11/13/20 1335   Pulse: 91   Resp: 17   Temp: 98 °F (36.7 °C)   SpO2: 95%       Physical Exam  Vitals signs and nursing note reviewed. Constitutional:       General: He is not in acute distress. Appearance: He is well-developed. He is not diaphoretic. HENT:      Mouth/Throat:      Mouth: Mucous membranes are moist.      Pharynx: Oropharynx is clear. No oropharyngeal exudate or posterior oropharyngeal erythema. Neck:     Lymphadenopathy:      Cervical: Cervical adenopathy present. Right cervical: Superficial cervical adenopathy present. Neurological:      Mental Status: He is alert. Psychiatric:         Behavior: Behavior normal.         Thought Content: Thought content normal.         Judgment: Judgment normal.         [facility]    ICD-10-CM ICD-9-CM   1. Acute lymphadenitis  L04.9 683       Orders Placed This Encounter    clindamycin (CLEOCIN) 300 mg capsule     Sig: Take 1 Cap by mouth three (3) times daily for 7 days. Dispense:  21 Cap     Refill:  0        PCP INI    If signs and symptoms become worse the pt is to go to the ER.          Procedures

## 2020-11-13 NOTE — TELEPHONE ENCOUNTER
Identified patient 2 identifiers verified. Patient  decided to go to Urgent Care today but accepted an appointment  With Dr. Sarah Sierra in December. Patient wants Dr. Sarah Sierra to look at the notes from [facility].

## 2020-11-27 ENCOUNTER — TELEPHONE (OUTPATIENT)
Dept: INTERNAL MEDICINE CLINIC | Age: 65
End: 2020-11-27

## 2020-11-27 NOTE — TELEPHONE ENCOUNTER
Spoke w/ pt to confirm appt w/ Ricarda Mediate for 12/1 @ 9:15 am. He asked to be called back about his results for his PSA.

## 2020-11-27 NOTE — TELEPHONE ENCOUNTER
Identified patient 2 identifiers verified. PSA was ordered   By Dr. Baljit Rodriguez. Patient will contact that office.

## 2020-11-27 NOTE — TELEPHONE ENCOUNTER
[facility]. [address] Office Pool               Caller's first and last name:   Reason for call: Retuning call to nurse   Callback required yes/no and why:  Yes   Best contact number(s): [phone]   Details to clarify the request: wanted results from PSA     Copy/Paste  ENVERA

## 2020-12-23 ENCOUNTER — HOSPITAL ENCOUNTER (OUTPATIENT)
Dept: RADIATION THERAPY | Age: 65
Discharge: HOME | End: 2020-12-23

## 2020-12-29 ENCOUNTER — HOSPITAL ENCOUNTER (OUTPATIENT)
Dept: RADIATION THERAPY | Age: 65
Discharge: HOME | End: 2020-12-29

## 2021-01-12 ENCOUNTER — HOSPITAL ENCOUNTER (OUTPATIENT)
Dept: RADIATION THERAPY | Age: 66
Discharge: HOME | End: 2021-01-12

## 2021-01-13 ENCOUNTER — HOSPITAL ENCOUNTER (OUTPATIENT)
Dept: RADIATION THERAPY | Age: 66
Discharge: HOME | End: 2021-01-13

## 2021-01-14 ENCOUNTER — HOSPITAL ENCOUNTER (OUTPATIENT)
Dept: RADIATION THERAPY | Age: 66
Discharge: HOME | End: 2021-01-14

## 2021-01-15 ENCOUNTER — HOSPITAL ENCOUNTER (OUTPATIENT)
Dept: RADIATION THERAPY | Age: 66
Discharge: HOME | End: 2021-01-15

## 2021-01-18 ENCOUNTER — HOSPITAL ENCOUNTER (OUTPATIENT)
Dept: RADIATION THERAPY | Age: 66
Discharge: HOME | End: 2021-01-18

## 2021-01-19 ENCOUNTER — HOSPITAL ENCOUNTER (OUTPATIENT)
Dept: RADIATION THERAPY | Age: 66
Discharge: HOME | End: 2021-01-19

## 2021-01-20 ENCOUNTER — HOSPITAL ENCOUNTER (OUTPATIENT)
Dept: RADIATION THERAPY | Age: 66
Discharge: HOME | End: 2021-01-20

## 2021-01-21 ENCOUNTER — HOSPITAL ENCOUNTER (OUTPATIENT)
Dept: RADIATION THERAPY | Age: 66
Discharge: HOME | End: 2021-01-21

## 2021-01-22 ENCOUNTER — HOSPITAL ENCOUNTER (OUTPATIENT)
Dept: RADIATION THERAPY | Age: 66
Discharge: HOME | End: 2021-01-22

## 2021-01-25 ENCOUNTER — HOSPITAL ENCOUNTER (OUTPATIENT)
Dept: RADIATION THERAPY | Age: 66
Discharge: HOME | End: 2021-01-25

## 2021-01-26 ENCOUNTER — HOSPITAL ENCOUNTER (OUTPATIENT)
Dept: RADIATION THERAPY | Age: 66
Discharge: HOME | End: 2021-01-26

## 2021-01-27 ENCOUNTER — HOSPITAL ENCOUNTER (OUTPATIENT)
Dept: RADIATION THERAPY | Age: 66
Discharge: HOME | End: 2021-01-27

## 2021-01-28 ENCOUNTER — HOSPITAL ENCOUNTER (OUTPATIENT)
Dept: RADIATION THERAPY | Age: 66
Discharge: HOME | End: 2021-01-28

## 2021-01-29 ENCOUNTER — HOSPITAL ENCOUNTER (OUTPATIENT)
Dept: RADIATION THERAPY | Age: 66
Discharge: HOME | End: 2021-01-29

## 2021-02-01 ENCOUNTER — HOSPITAL ENCOUNTER (OUTPATIENT)
Dept: RADIATION THERAPY | Age: 66
Discharge: HOME | End: 2021-02-01

## 2021-02-02 ENCOUNTER — HOSPITAL ENCOUNTER (OUTPATIENT)
Dept: RADIATION THERAPY | Age: 66
Discharge: HOME | End: 2021-02-02

## 2021-02-03 ENCOUNTER — HOSPITAL ENCOUNTER (OUTPATIENT)
Dept: RADIATION THERAPY | Age: 66
Discharge: HOME | End: 2021-02-03

## 2021-02-04 ENCOUNTER — HOSPITAL ENCOUNTER (OUTPATIENT)
Dept: RADIATION THERAPY | Age: 66
Discharge: HOME | End: 2021-02-04

## 2021-02-05 ENCOUNTER — HOSPITAL ENCOUNTER (OUTPATIENT)
Dept: RADIATION THERAPY | Age: 66
Discharge: HOME | End: 2021-02-05

## 2021-02-08 ENCOUNTER — HOSPITAL ENCOUNTER (OUTPATIENT)
Dept: RADIATION THERAPY | Age: 66
Discharge: HOME | End: 2021-02-08

## 2021-02-09 ENCOUNTER — HOSPITAL ENCOUNTER (OUTPATIENT)
Dept: RADIATION THERAPY | Age: 66
Discharge: HOME | End: 2021-02-09

## 2021-02-10 ENCOUNTER — HOSPITAL ENCOUNTER (OUTPATIENT)
Dept: RADIATION THERAPY | Age: 66
Discharge: HOME | End: 2021-02-10

## 2021-02-11 ENCOUNTER — HOSPITAL ENCOUNTER (OUTPATIENT)
Dept: RADIATION THERAPY | Age: 66
Discharge: HOME | End: 2021-02-11

## 2021-02-12 ENCOUNTER — HOSPITAL ENCOUNTER (OUTPATIENT)
Dept: RADIATION THERAPY | Age: 66
Discharge: HOME | End: 2021-02-12

## 2021-02-15 ENCOUNTER — HOSPITAL ENCOUNTER (OUTPATIENT)
Dept: RADIATION THERAPY | Age: 66
Discharge: HOME | End: 2021-02-15

## 2021-02-16 ENCOUNTER — HOSPITAL ENCOUNTER (OUTPATIENT)
Dept: RADIATION THERAPY | Age: 66
Discharge: HOME | End: 2021-02-16

## 2021-02-17 ENCOUNTER — HOSPITAL ENCOUNTER (OUTPATIENT)
Dept: RADIATION THERAPY | Age: 66
Discharge: HOME | End: 2021-02-17

## 2021-02-18 ENCOUNTER — HOSPITAL ENCOUNTER (OUTPATIENT)
Dept: RADIATION THERAPY | Age: 66
Discharge: HOME | End: 2021-02-18

## 2021-03-01 ENCOUNTER — OFFICE VISIT (OUTPATIENT)
Dept: INTERNAL MEDICINE CLINIC | Age: 66
End: 2021-03-01
Payer: COMMERCIAL

## 2021-03-01 VITALS
WEIGHT: 242.2 LBS | OXYGEN SATURATION: 95 % | SYSTOLIC BLOOD PRESSURE: 122 MMHG | HEART RATE: 100 BPM | TEMPERATURE: 95.5 F | RESPIRATION RATE: 16 BRPM | DIASTOLIC BLOOD PRESSURE: 76 MMHG | HEIGHT: 74 IN | BODY MASS INDEX: 31.08 KG/M2

## 2021-03-01 DIAGNOSIS — C61 PROSTATE CANCER (HCC): ICD-10-CM

## 2021-03-01 DIAGNOSIS — E55.9 VITAMIN D DEFICIENCY: ICD-10-CM

## 2021-03-01 DIAGNOSIS — E78.00 HYPERCHOLESTEREMIA: ICD-10-CM

## 2021-03-01 DIAGNOSIS — I10 ESSENTIAL HYPERTENSION: Primary | ICD-10-CM

## 2021-03-01 DIAGNOSIS — R73.9 BORDERLINE HYPERGLYCEMIA: ICD-10-CM

## 2021-03-01 PROCEDURE — 99214 OFFICE O/P EST MOD 30 MIN: CPT | Performed by: INTERNAL MEDICINE

## 2021-03-01 RX ORDER — PREDNISONE 5 MG/1
TABLET ORAL
COMMUNITY
Start: 2021-02-15 | End: 2022-02-10

## 2021-03-01 RX ORDER — ABIRATERONE ACETATE 250 MG/1
TABLET ORAL
COMMUNITY
Start: 2021-02-13 | End: 2022-02-10

## 2021-03-01 NOTE — PROGRESS NOTES
SUBJECTIVE:   Mr. Shari Landau. is a 63 y.o. male who is here for follow up. Previously he saw Dr. Josephine Braga, and before that Dr. Mariano Knows. Chief Complaint   Patient presents with    Hypertension     routine f.u & fasting lab work - routine labs & psa      Back pain stable. Weight is down a bit:   Wt Readings from Last 3 Encounters:   03/01/21 242 lb 3.2 oz (109.9 kg)   11/13/20 248 lb (112.5 kg)   10/08/20 248 lb (112.5 kg)     Prostate Cancer: He recently had bone scan and PET. Initially saw Dr. Pratibha Seo, then Dr. Herlinda Tijerina when insurance changed. Now Dr. Pratibha Seo again. He has had prostate cancer; He completed XRT in Feb 2014. He is no longer seeing Dr. Armando Reeves, for CKD--\"as needed if the labs get worse. \"   He saw Dr. Agatha Noble and had PFT, due to sarcoid. This issue is quiescent. He had diagnosis years ago, with liver biopsy. He gets yearly eye check. At this time, he is otherwise doing well and has brought no other complaints to my attention today. For a list of the medical issues addressed today, see the assessment and plan below. PMH: Colon polypectomy 2018 Dr. Alek Cummings. Past Medical History:   Diagnosis Date    Arthritis     CKD (chronic kidney disease) 5/21/2014    Hypercholesterolemia     Hypertension     Increasing prostate specific antigen (PSA) level after treatment for malignant neoplasm of prostate 7/17/2020    After XRT his PSA went from 6.1 to 1.3. It stayed below 3 until 1/4/17. His PSA was increasingto 4.0 2/7/17. Rebiopsy of his prostate 2/17/17 revealed benign prostate tissue. 35cc gland. PSA 11.1 on 8/3/18. Repeat biopsy 9/18/18 revealed benign tissue. PSA 9/4/18 was 11.7. PSA 5/22/19 was 16.2 with 6.4% free. 6/6/19 Axumin PET did not reveal active disease. PSA 3/9/2020: 35.4 per PCP labs.  PSA 6/3    Prostate cancer ([address] 5/21/2014    Sarcoid        Past Surgical History:   Procedure Laterality Date    HX CIRCUMCISION      HX HERNIA REPAIR      HX KNEE ARTHROSCOPY  HX PROSTATECTOMY      HX TONSILLECTOMY         All: He has No Known Allergies. Current Outpatient Medications   Medication Sig    abiraterone 250 mg tab     predniSONE (DELTASONE) 5 mg tablet TAKE 1 TABLET BY MOUTH EVERY DAY    losartan-hydroCHLOROthiazide (HYZAAR) 50-12.5 mg per tablet Take 1 Tab by mouth daily.  simvastatin (ZOCOR) 10 mg tablet TAKE 1 TABLET BY MOUTH NIGHTLY     No current facility-administered medications for this visit. FH: His mother  68 of lung cancer, HTN. Father  of lung cancer age 68. One sister  age 1 of heart defect. Sister and brother are alive and well. 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 He reports that he has quit smoking. He quit after 1.00 year of use. He has never used smokeless tobacco. He reports current alcohol use. He reports that he does not use drugs. ROS: See above; Complete ROS otherwise negative. OBJECTIVE:   Vitals:   Visit Vitals  /76 (BP 1 Location: Left upper arm, BP Patient Position: Sitting, BP Cuff Size: Adult long)   Pulse 100   Temp (!) 95.5 °F (35.3 °C) (Temporal)   Resp 16   Ht 6' 2\" (1.88 m)   Wt 242 lb 3.2 oz (109.9 kg)   SpO2 95%   BMI 31.10 kg/m²      Gen: Pleasant 63 y.o. W male in [facility]. Lab Results   Component Value Date/Time    Sodium 139 2020 10:22 AM    Potassium 4.1 2020 10:22 AM    Chloride 102 2020 10:22 AM    CO2 24 2020 10:22 AM    Anion gap 8 2013 05:00 AM    Glucose 96 2020 10:22 AM    BUN 17 2020 10:22 AM    Creatinine 1.29 (H) 2020 10:22 AM    BUN/Creatinine ratio 13 2020 10:22 AM    GFR est AA 68 2020 10:22 AM    GFR est non-AA 59 (L) 2020 10:22 AM    Calcium 9.3 2020 10:22 AM    Bilirubin, total 0.8 2020 10:22 AM    ALT (SGPT) 41 2020 10:22 AM    Alk.  phosphatase 109 2020 10:22 AM    Protein, total 7.2 2020 10:22 AM    Albumin 4.1 2020 10:22 AM Globulin 5.5 (H) 07/31/2013 12:55 PM    A-G Ratio 1.3 07/06/2020 10:22 AM       Lab Results   Component Value Date/Time    Cholesterol, total 180 07/06/2020 10:22 AM    HDL Cholesterol 49 07/06/2020 10:22 AM    LDL,Direct 93 05/21/2014 12:26 PM    LDL, calculated 89 07/06/2020 10:22 AM    Triglyceride 212 (H) 07/06/2020 10:22 AM        Lab Results   Component Value Date/Time    Hemoglobin A1c 6.0 (H) 07/06/2020 10:22 AM       Lab Results   Component Value Date/Time    WBC 7.9 07/06/2020 10:22 AM    HGB 15.2 07/06/2020 10:22 AM    HCT 45.4 07/06/2020 10:22 AM    PLATELET 878 (L) 55/37/6464 10:22 AM    MCV 93 07/06/2020 10:22 AM         ASSESSMENT/ PLAN:   1. HTN (hypertension): BP fine today. 2. Hypercholesteremia: Last labs look normal. No changes. 3. CKD (chronic kidney disease), stage 3 (moderate): GFR as above--normal.  Mild. Dr. Jameson Pimentel cut him loose. 4. Prostate cancer: s/p XRT and previous follow up Dr. Lyle Cordero. Now seeing urologist, Dr. Kelsey Candelario again. 5. Knee pain, right:  Doing better. 6. Sarcoid: He has seen Dr. Jd Barrett. 7. Hyperglycemia / prediabetes: Watch diet and exercise. 8. Colon cancer screening/polyps: Per Dr. Nahomy Wolfe. Follow up in about 6 months.

## 2021-03-01 NOTE — PATIENT INSTRUCTIONS
Office Policies    Phone calls/patient messages:            Please allow up to 24 hours for someone in the office to contact you about your call or message. Be mindful your provider may be out of the office or your message may require further review. We encourage you to use [employer] for your messages as this is a faster, more efficient way to communicate with our office                         Medication Refills:            Prescription medications require 48-72 business hours to process. We encourage you to use [employer] for your refills. For controlled medications: Please allow 72 business hours to process. Certain medications may require you to  a written prescription at our office. NO narcotic/controlled medications will be prescribed after 4pm Monday through Friday or on weekends              Form/Paperwork Completion:            Please note a $25 fee may incur for all paperwork for completed by our providers. We ask that you allow 7-10 business days. Pre-payment is due prior to picking up/faxing the completed form. You may also download your forms to [employer] to have your doctor print off.      1. Have you been to the ER, urgent care clinic since your last visit? Hospitalized since your last visit?no    2. Have you seen or consulted any other health care providers outside of the [address] [place] [address] since your last visit? Include any pap smears or colon screening.  no

## 2021-03-02 LAB
25(OH)D3+25(OH)D2 SERPL-MCNC: 41.2 NG/ML (ref 30–100)
ALBUMIN SERPL-MCNC: 4.4 G/DL (ref 3.8–4.8)
ALBUMIN/GLOB SERPL: 1.4 {RATIO} (ref 1.2–2.2)
ALP SERPL-CCNC: 138 IU/L (ref 39–117)
ALT SERPL-CCNC: 36 IU/L (ref 0–44)
AST SERPL-CCNC: 30 IU/L (ref 0–40)
BASOPHILS # BLD AUTO: 0 X10E3/UL (ref 0–0.2)
BASOPHILS NFR BLD AUTO: 0 %
BILIRUB SERPL-MCNC: 0.8 MG/DL (ref 0–1.2)
BUN SERPL-MCNC: 18 MG/DL (ref 8–27)
BUN/CREAT SERPL: 15 (ref 10–24)
CALCIUM SERPL-MCNC: 10 MG/DL (ref 8.6–10.2)
CHLORIDE SERPL-SCNC: 103 MMOL/L (ref 96–106)
CHOLEST SERPL-MCNC: 196 MG/DL (ref 100–199)
CO2 SERPL-SCNC: 29 MMOL/L (ref 20–29)
CREAT SERPL-MCNC: 1.17 MG/DL (ref 0.76–1.27)
EOSINOPHIL # BLD AUTO: 0.1 X10E3/UL (ref 0–0.4)
EOSINOPHIL NFR BLD AUTO: 1 %
ERYTHROCYTE [DISTWIDTH] IN BLOOD BY AUTOMATED COUNT: 14.5 % (ref 11.6–15.4)
EST. AVERAGE GLUCOSE BLD GHB EST-MCNC: 134 MG/DL
GLOBULIN SER CALC-MCNC: 3.2 G/DL (ref 1.5–4.5)
GLUCOSE SERPL-MCNC: 96 MG/DL (ref 65–99)
HBA1C MFR BLD: 6.3 % (ref 4.8–5.6)
HCT VFR BLD AUTO: 40.9 % (ref 37.5–51)
HDLC SERPL-MCNC: 60 MG/DL
HGB BLD-MCNC: 14.2 G/DL (ref 13–17.7)
IMM GRANULOCYTES # BLD AUTO: 0 X10E3/UL (ref 0–0.1)
IMM GRANULOCYTES NFR BLD AUTO: 0 %
LDLC SERPL CALC-MCNC: 104 MG/DL (ref 0–99)
LYMPHOCYTES # BLD AUTO: 0.8 X10E3/UL (ref 0.7–3.1)
LYMPHOCYTES NFR BLD AUTO: 12 %
MCH RBC QN AUTO: 32.3 PG (ref 26.6–33)
MCHC RBC AUTO-ENTMCNC: 34.7 G/DL (ref 31.5–35.7)
MCV RBC AUTO: 93 FL (ref 79–97)
MONOCYTES # BLD AUTO: 0.7 X10E3/UL (ref 0.1–0.9)
MONOCYTES NFR BLD AUTO: 9 %
NEUTROPHILS # BLD AUTO: 5.3 X10E3/UL (ref 1.4–7)
NEUTROPHILS NFR BLD AUTO: 78 %
PLATELET # BLD AUTO: 162 X10E3/UL (ref 150–450)
POTASSIUM SERPL-SCNC: 4.3 MMOL/L (ref 3.5–5.2)
PROT SERPL-MCNC: 7.6 G/DL (ref 6–8.5)
PSA SERPL-MCNC: <0.1 NG/ML (ref 0–4)
RBC # BLD AUTO: 4.39 X10E6/UL (ref 4.14–5.8)
SODIUM SERPL-SCNC: 143 MMOL/L (ref 134–144)
TRIGL SERPL-MCNC: 185 MG/DL (ref 0–149)
VLDLC SERPL CALC-MCNC: 32 MG/DL (ref 5–40)
WBC # BLD AUTO: 6.9 X10E3/UL (ref 3.4–10.8)

## 2021-03-08 ENCOUNTER — TELEPHONE (OUTPATIENT)
Dept: INTERNAL MEDICINE CLINIC | Age: 66
End: 2021-03-08

## 2021-03-08 NOTE — TELEPHONE ENCOUNTER
Caller's first and last name: N/A       Reason for call: Requesting lab results       Callback required yes/no and why: Yes/provide results       Best contact number(s): [phone]       Details to clarify the request: PT would like results mailed to him as well. Please also follow up via phone to provide results over the phone.        Raghav Joseph

## 2021-03-11 NOTE — TELEPHONE ENCOUNTER
Identified patient 2 identifiers verified. Patient I upset because he has not received lab results. Patient aware results have to be reviewed by Dr. Stephania Garcia . This encounter has been fowarded Chloe for review.

## 2021-03-11 NOTE — TELEPHONE ENCOUNTER
#[phone]   Pt states he has been waiting since Monday, 3-8-21 for you to call him with lab results. He also wanted them mailed to him, not just mailed he states.

## 2021-05-06 ENCOUNTER — OFFICE VISIT (OUTPATIENT)
Dept: URGENT CARE | Age: 66
End: 2021-05-06
Payer: COMMERCIAL

## 2021-05-06 VITALS
DIASTOLIC BLOOD PRESSURE: 78 MMHG | HEART RATE: 83 BPM | OXYGEN SATURATION: 98 % | SYSTOLIC BLOOD PRESSURE: 131 MMHG | RESPIRATION RATE: 16 BRPM | TEMPERATURE: 97.7 F

## 2021-05-06 DIAGNOSIS — M54.50 ACUTE BILATERAL LOW BACK PAIN WITHOUT SCIATICA: ICD-10-CM
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
## 2021-05-06 PROCEDURE — 99212 OFFICE O/P EST SF 10 MIN: CPT | Performed by: NURSE PRACTITIONER
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
## 2021-05-11 ENCOUNTER — TELEPHONE (OUTPATIENT)
Dept: INTERNAL MEDICINE CLINIC | Age: 66
End: 2021-05-11
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
## 2021-08-30 ENCOUNTER — OFFICE VISIT (OUTPATIENT)
Dept: INTERNAL MEDICINE CLINIC | Age: 66
End: 2021-08-30
Payer: COMMERCIAL

## 2021-08-30 VITALS
HEART RATE: 90 BPM | DIASTOLIC BLOOD PRESSURE: 82 MMHG | BODY MASS INDEX: 32.75 KG/M2 | WEIGHT: 255.2 LBS | TEMPERATURE: 97.5 F | OXYGEN SATURATION: 95 % | SYSTOLIC BLOOD PRESSURE: 153 MMHG | RESPIRATION RATE: 18 BRPM | HEIGHT: 74 IN

## 2021-08-30 DIAGNOSIS — I10 ESSENTIAL HYPERTENSION: Primary | ICD-10-CM

## 2021-08-30 DIAGNOSIS — R73.9 BORDERLINE HYPERGLYCEMIA: ICD-10-CM

## 2021-08-30 DIAGNOSIS — E55.9 VITAMIN D DEFICIENCY: ICD-10-CM

## 2021-08-30 DIAGNOSIS — N18.30 STAGE 3 CHRONIC KIDNEY DISEASE, UNSPECIFIED WHETHER STAGE 3A OR 3B CKD (HCC): ICD-10-CM

## 2021-08-30 DIAGNOSIS — E78.00 HYPERCHOLESTEREMIA: ICD-10-CM

## 2021-08-30 DIAGNOSIS — C61 PROSTATE CANCER (HCC): ICD-10-CM

## 2021-08-30 PROCEDURE — 99214 OFFICE O/P EST MOD 30 MIN: CPT | Performed by: INTERNAL MEDICINE

## 2021-08-30 NOTE — PROGRESS NOTES
SUBJECTIVE:   Mr. Servando Mortimer. is a 61 y.o. male who is here for follow up. Previously he saw Dr. Garrick Mckeon, and before that Dr. Isla Spurling. Chief Complaint   Patient presents with    Hypertension     6 month follow up     Back pain stable. Weight is down a bit:   Wt Readings from Last 3 Encounters:   08/30/21 255 lb 3.2 oz (115.8 kg)   03/01/21 242 lb 3.2 oz (109.9 kg)   11/13/20 248 lb (112.5 kg)     Prostate Cancer: He recently had bone scan and PET. Initially saw Dr. Timothy Gunter, then Dr. Torres Hatch when insurance changed. Now Dr. Timothy Gunter again, as well as Kvng Oliveros and Flavia Olvera. He has had prostate cancer; He completed XRT in Feb 2014. He is no longer seeing Dr. Mariaa Rudolph, for CKD--\"as needed if the labs get worse. \"   He saw Dr. Markus Quigley at one time and had PFT, due to sarcoid. This issue is quiescent. He had diagnosis years ago, with liver biopsy. He gets yearly eye check. At this time, he is otherwise doing well and has brought no other complaints to my attention today. For a list of the medical issues addressed today, see the assessment and plan below. PMH: Colon polypectomy 2018 Dr. Magnolia Rich. Past Medical History:   Diagnosis Date    Arthritis     CKD (chronic kidney disease) 5/21/2014    Hypercholesterolemia     Hypertension     Increasing prostate specific antigen (PSA) level after treatment for malignant neoplasm of prostate 7/17/2020    After XRT his PSA went from 6.1 to 1.3. It stayed below 3 until 1/4/17. His PSA was increasingto 4.0 2/7/17. Rebiopsy of his prostate 2/17/17 revealed benign prostate tissue. 35cc gland. PSA 11.1 on 8/3/18. Repeat biopsy 9/18/18 revealed benign tissue. PSA 9/4/18 was 11.7. PSA 5/22/19 was 16.2 with 6.4% free. 6/6/19 Axumin PET did not reveal active disease. PSA 3/9/2020: 35.4 per PCP labs.  PSA 6/3    Prostate cancer ([address] 5/21/2014    Sarcoid        Past Surgical History:   Procedure Laterality Date    HX CIRCUMCISION      HX HERNIA REPAIR      HX KNEE ARTHROSCOPY      HX PROSTATECTOMY      HX TONSILLECTOMY         All: He has No Known Allergies. Current Outpatient Medications   Medication Sig    abiraterone 250 mg tab     losartan-hydroCHLOROthiazide (HYZAAR) 50-12.5 mg per tablet Take 1 Tab by mouth daily.  simvastatin (ZOCOR) 10 mg tablet TAKE 1 TABLET BY MOUTH NIGHTLY    predniSONE (DELTASONE) 5 mg tablet TAKE 1 TABLET BY MOUTH EVERY DAY (Patient not taking: Reported on 2021)     No current facility-administered medications for this visit. FH: His mother  68 of lung cancer, HTN. Father  of lung cancer age 68. One sister  age 1 of heart defect. Sister and brother are alive and well. 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 He reports that he has quit smoking. He quit after 1.00 year of use. He has never used smokeless tobacco. He reports current alcohol use. He reports that he does not use drugs. ROS: See above; Complete ROS otherwise negative. OBJECTIVE:   Vitals:   Visit Vitals  BP (!) 153/82 (BP 1 Location: Left arm, BP Patient Position: Sitting)   Pulse 90   Temp 97.5 °F (36.4 °C) (Temporal)   Resp 18   Ht 6' 2\" (1.88 m)   Wt 255 lb 3.2 oz (115.8 kg)   SpO2 95%   BMI 32.77 kg/m²      Gen: Pleasant 61 y.o. W male in [facility]. HEENT: PERRLA. EOMI. TM pearly bilaterally. OP moist and pink. Neck: Supple. No LAD. HEART: RRR, No M/G/R.    LUNGS: CTAB No W/R. ABDOMEN: S, NT, ND, BS+. EXTREMITIES: Warm. No C/C/E.  MUSCULOSKELETAL: Normal ROM, muscle strength 5/5 all groups. NEURO: Alert and oriented x 3. Cranial nerves grossly intact. No focal sensory or motor deficits noted. SKIN: Warm. Dry. No rashes or other lesions noted.       Lab Results   Component Value Date/Time    Sodium 143 2021 12:17 PM    Potassium 4.3 2021 12:17 PM    Chloride 103 2021 12:17 PM    CO2 29 2021 12:17 PM    Anion gap 8 2013 05:00 AM    Glucose 96 2021 12:17 PM    BUN 18 03/01/2021 12:17 PM    Creatinine 1.17 03/01/2021 12:17 PM    BUN/Creatinine ratio 15 03/01/2021 12:17 PM    GFR est AA 76 03/01/2021 12:17 PM    GFR est non-AA 66 03/01/2021 12:17 PM    Calcium 10.0 03/01/2021 12:17 PM    Bilirubin, total 0.8 03/01/2021 12:17 PM    ALT (SGPT) 36 03/01/2021 12:17 PM    Alk. phosphatase 138 (H) 03/01/2021 12:17 PM    Protein, total 7.6 03/01/2021 12:17 PM    Albumin 4.4 03/01/2021 12:17 PM    Globulin 5.5 (H) 07/31/2013 12:55 PM    A-G Ratio 1.4 03/01/2021 12:17 PM       Lab Results   Component Value Date/Time    Cholesterol, total 196 03/01/2021 12:17 PM    HDL Cholesterol 60 03/01/2021 12:17 PM    LDL,Direct 93 05/21/2014 12:26 PM    LDL, calculated 104 (H) 03/01/2021 12:17 PM    LDL, calculated 89 07/06/2020 10:22 AM    Triglyceride 185 (H) 03/01/2021 12:17 PM        Lab Results   Component Value Date/Time    Hemoglobin A1c 6.3 (H) 03/01/2021 12:17 PM       Lab Results   Component Value Date/Time    WBC 6.9 03/01/2021 12:17 PM    HGB 14.2 03/01/2021 12:17 PM    HCT 40.9 03/01/2021 12:17 PM    PLATELET 875 95/41/7006 12:17 PM    MCV 93 03/01/2021 12:17 PM         ASSESSMENT/ PLAN:   1. HTN (hypertension): BP high today. 2. Hypercholesteremia: Last labs look normal. No changes. 3. CKD (chronic kidney disease), stage 3 (moderate): GFR as above--normal.  Mild. Dr. Kathy Phillips cut him loose. 4. Prostate cancer: s/p XRT and previous follow up Dr. Sanjiv Pelayo. Now seeing urologist, Dr. Ector Calloway again. 5. Knee pain, right:  Doing better. 6. Sarcoid: He has seen Dr. Sharonda Wilkes. 7. Hyperglycemia / prediabetes: Watch diet and exercise. 8. Colon cancer screening/polyps: Per Dr. Dustin Xiong. Follow up in about 6 months.

## 2021-08-31 LAB
25(OH)D3+25(OH)D2 SERPL-MCNC: 42.4 NG/ML (ref 30–100)
ALBUMIN SERPL-MCNC: 4.3 G/DL (ref 3.8–4.8)
ALBUMIN/GLOB SERPL: 1.5 {RATIO} (ref 1.2–2.2)
ALP SERPL-CCNC: 135 IU/L (ref 48–121)
ALT SERPL-CCNC: 25 IU/L (ref 0–44)
AST SERPL-CCNC: 22 IU/L (ref 0–40)
BASOPHILS # BLD AUTO: 0 X10E3/UL (ref 0–0.2)
BASOPHILS NFR BLD AUTO: 0 %
BILIRUB SERPL-MCNC: 0.5 MG/DL (ref 0–1.2)
BUN SERPL-MCNC: 17 MG/DL (ref 8–27)
BUN/CREAT SERPL: 15 (ref 10–24)
CALCIUM SERPL-MCNC: 9.5 MG/DL (ref 8.6–10.2)
CHLORIDE SERPL-SCNC: 104 MMOL/L (ref 96–106)
CHOLEST SERPL-MCNC: 250 MG/DL (ref 100–199)
CO2 SERPL-SCNC: 21 MMOL/L (ref 20–29)
CREAT SERPL-MCNC: 1.16 MG/DL (ref 0.76–1.27)
EOSINOPHIL # BLD AUTO: 0.1 X10E3/UL (ref 0–0.4)
EOSINOPHIL NFR BLD AUTO: 1 %
ERYTHROCYTE [DISTWIDTH] IN BLOOD BY AUTOMATED COUNT: 14 % (ref 11.6–15.4)
EST. AVERAGE GLUCOSE BLD GHB EST-MCNC: 128 MG/DL
GLOBULIN SER CALC-MCNC: 2.9 G/DL (ref 1.5–4.5)
GLUCOSE SERPL-MCNC: 92 MG/DL (ref 65–99)
HBA1C MFR BLD: 6.1 % (ref 4.8–5.6)
HCT VFR BLD AUTO: 41.6 % (ref 37.5–51)
HDLC SERPL-MCNC: 46 MG/DL
HGB BLD-MCNC: 13.9 G/DL (ref 13–17.7)
IMM GRANULOCYTES # BLD AUTO: 0 X10E3/UL (ref 0–0.1)
IMM GRANULOCYTES NFR BLD AUTO: 0 %
LDLC SERPL CALC-MCNC: 161 MG/DL (ref 0–99)
LYMPHOCYTES # BLD AUTO: 1.6 X10E3/UL (ref 0.7–3.1)
LYMPHOCYTES NFR BLD AUTO: 23 %
MCH RBC QN AUTO: 31.6 PG (ref 26.6–33)
MCHC RBC AUTO-ENTMCNC: 33.4 G/DL (ref 31.5–35.7)
MCV RBC AUTO: 95 FL (ref 79–97)
MONOCYTES # BLD AUTO: 0.7 X10E3/UL (ref 0.1–0.9)
MONOCYTES NFR BLD AUTO: 10 %
NEUTROPHILS # BLD AUTO: 4.7 X10E3/UL (ref 1.4–7)
NEUTROPHILS NFR BLD AUTO: 66 %
PLATELET # BLD AUTO: 213 X10E3/UL (ref 150–450)
POTASSIUM SERPL-SCNC: 4.3 MMOL/L (ref 3.5–5.2)
PROT SERPL-MCNC: 7.2 G/DL (ref 6–8.5)
RBC # BLD AUTO: 4.4 X10E6/UL (ref 4.14–5.8)
SODIUM SERPL-SCNC: 141 MMOL/L (ref 134–144)
TRIGL SERPL-MCNC: 230 MG/DL (ref 0–149)
VLDLC SERPL CALC-MCNC: 43 MG/DL (ref 5–40)
WBC # BLD AUTO: 7.1 X10E3/UL (ref 3.4–10.8)

## 2021-09-10 ENCOUNTER — HOSPITAL ENCOUNTER (OUTPATIENT)
Dept: RADIATION THERAPY | Age: 66
Discharge: HOME | End: 2021-09-10

## 2021-10-26 RX ORDER — LOSARTAN POTASSIUM AND HYDROCHLOROTHIAZIDE 12.5; 5 MG/1; MG/1
TABLET ORAL
Qty: 90 TABLET | Refills: 3 | Status: SHIPPED | OUTPATIENT
Start: 2021-10-26 | End: 2022-08-16 | Stop reason: SDUPTHER

## 2022-01-24 ENCOUNTER — TELEPHONE (OUTPATIENT)
Dept: INTERNAL MEDICINE CLINIC | Age: 67
End: 2022-01-24

## 2022-01-24 DIAGNOSIS — C61 PROSTATE CANCER (HCC): ICD-10-CM

## 2022-01-24 DIAGNOSIS — R73.9 BORDERLINE HYPERGLYCEMIA: ICD-10-CM

## 2022-01-24 DIAGNOSIS — I10 ESSENTIAL HYPERTENSION: Primary | ICD-10-CM

## 2022-01-24 DIAGNOSIS — E55.9 VITAMIN D DEFICIENCY: ICD-10-CM

## 2022-01-24 NOTE — TELEPHONE ENCOUNTER
----- Message from Elise Prince sent at 1/24/2022  3:57 PM EST -----  Subject: Message to Provider    QUESTIONS  Information for Provider? Pt is coming in on 2/10 would like to have order   for labs done the week before.  ---------------------------------------------------------------------------  --------------  CALL BACK INFO  What is the best way for the office to contact you? OK to leave message on   voicemail  Preferred Call Back Phone Number? [phone]  ---------------------------------------------------------------------------  --------------  SCRIPT ANSWERS  Relationship to Patient?  Self

## 2022-01-25 NOTE — TELEPHONE ENCOUNTER
Called, spoke with Pt. Two pt identifiers confirmed. Pt informed Dr. Lina Myers put in his labs for him to get done before appt. Pt verbalized understanding of information discussed w/ no further questions at this time.

## 2022-01-26 ENCOUNTER — TELEPHONE (OUTPATIENT)
Dept: INTERNAL MEDICINE CLINIC | Age: 67
End: 2022-01-26

## 2022-01-26 NOTE — TELEPHONE ENCOUNTER
----- Message from Conrado Madrigal sent at 1/26/2022 12:41 PM EST -----  Subject: Message to Provider    QUESTIONS  Information for Provider? Please call patient as he was looking to call   his urologist. I was unable to help him. He is looking to make an appt   there. I was not sure how to help him. He was looking mostly for phone   numbers and then imaging, and then he hung up on me.   ---------------------------------------------------------------------------  --------------  [address]  What is the best way for the office to contact you? OK to leave message on   voicemail  Preferred Call Back Phone Number? [phone]  ---------------------------------------------------------------------------  --------------  SCRIPT ANSWERS  Relationship to Patient?  Self

## 2022-01-26 NOTE — TELEPHONE ENCOUNTER
Pt called (verified name and date of birth) and pt was notified that the call was in regards to a previous message. Pt stated that the urologist he was looking for was Dr. Abdi Navarro and was wondering if he had a PSA lab to do. Pt notified that the lab is already put in for them. Pt acknowledged and call was ended.

## 2022-02-07 ENCOUNTER — APPOINTMENT (OUTPATIENT)
Dept: INTERNAL MEDICINE CLINIC | Age: 67
End: 2022-02-07

## 2022-02-08 LAB
25(OH)D3+25(OH)D2 SERPL-MCNC: 51.3 NG/ML (ref 30–100)
ALBUMIN SERPL-MCNC: 4.3 G/DL (ref 3.8–4.8)
ALBUMIN/GLOB SERPL: 1.3 {RATIO} (ref 1.2–2.2)
ALP SERPL-CCNC: 131 IU/L (ref 44–121)
ALT SERPL-CCNC: 28 IU/L (ref 0–44)
AST SERPL-CCNC: 24 IU/L (ref 0–40)
BASOPHILS # BLD AUTO: 0 X10E3/UL (ref 0–0.2)
BASOPHILS NFR BLD AUTO: 0 %
BILIRUB SERPL-MCNC: 0.8 MG/DL (ref 0–1.2)
BUN SERPL-MCNC: 15 MG/DL (ref 8–27)
BUN/CREAT SERPL: 13 (ref 10–24)
CALCIUM SERPL-MCNC: 9.6 MG/DL (ref 8.6–10.2)
CHLORIDE SERPL-SCNC: 102 MMOL/L (ref 96–106)
CHOLEST SERPL-MCNC: 209 MG/DL (ref 100–199)
CO2 SERPL-SCNC: 24 MMOL/L (ref 20–29)
CREAT SERPL-MCNC: 1.19 MG/DL (ref 0.76–1.27)
EOSINOPHIL # BLD AUTO: 0.1 X10E3/UL (ref 0–0.4)
EOSINOPHIL NFR BLD AUTO: 1 %
ERYTHROCYTE [DISTWIDTH] IN BLOOD BY AUTOMATED COUNT: 14.1 % (ref 11.6–15.4)
EST. AVERAGE GLUCOSE BLD GHB EST-MCNC: 128 MG/DL
GLOBULIN SER CALC-MCNC: 3.2 G/DL (ref 1.5–4.5)
GLUCOSE SERPL-MCNC: 88 MG/DL (ref 65–99)
HBA1C MFR BLD: 6.1 % (ref 4.8–5.6)
HCT VFR BLD AUTO: 44.5 % (ref 37.5–51)
HDLC SERPL-MCNC: 45 MG/DL
HGB BLD-MCNC: 15.5 G/DL (ref 13–17.7)
IMM GRANULOCYTES # BLD AUTO: 0 X10E3/UL (ref 0–0.1)
IMM GRANULOCYTES NFR BLD AUTO: 0 %
LDLC SERPL CALC-MCNC: 119 MG/DL (ref 0–99)
LYMPHOCYTES # BLD AUTO: 1.9 X10E3/UL (ref 0.7–3.1)
LYMPHOCYTES NFR BLD AUTO: 28 %
MCH RBC QN AUTO: 31.5 PG (ref 26.6–33)
MCHC RBC AUTO-ENTMCNC: 34.8 G/DL (ref 31.5–35.7)
MCV RBC AUTO: 90 FL (ref 79–97)
MONOCYTES # BLD AUTO: 0.6 X10E3/UL (ref 0.1–0.9)
MONOCYTES NFR BLD AUTO: 9 %
NEUTROPHILS # BLD AUTO: 4.1 X10E3/UL (ref 1.4–7)
NEUTROPHILS NFR BLD AUTO: 62 %
PLATELET # BLD AUTO: 178 X10E3/UL (ref 150–450)
POTASSIUM SERPL-SCNC: 4.4 MMOL/L (ref 3.5–5.2)
PROT SERPL-MCNC: 7.5 G/DL (ref 6–8.5)
PSA SERPL-MCNC: 0.4 NG/ML (ref 0–4)
RBC # BLD AUTO: 4.92 X10E6/UL (ref 4.14–5.8)
REFLEX CRITERIA: NORMAL
SODIUM SERPL-SCNC: 142 MMOL/L (ref 134–144)
TRIGL SERPL-MCNC: 259 MG/DL (ref 0–149)
VLDLC SERPL CALC-MCNC: 45 MG/DL (ref 5–40)
WBC # BLD AUTO: 6.7 X10E3/UL (ref 3.4–10.8)

## 2022-02-10 ENCOUNTER — OFFICE VISIT (OUTPATIENT)
Dept: INTERNAL MEDICINE CLINIC | Age: 67
End: 2022-02-10
Payer: COMMERCIAL

## 2022-02-10 VITALS
SYSTOLIC BLOOD PRESSURE: 128 MMHG | DIASTOLIC BLOOD PRESSURE: 79 MMHG | WEIGHT: 255 LBS | BODY MASS INDEX: 32.73 KG/M2 | RESPIRATION RATE: 18 BRPM | HEART RATE: 80 BPM | HEIGHT: 74 IN | OXYGEN SATURATION: 98 % | TEMPERATURE: 97.2 F

## 2022-02-10 DIAGNOSIS — I10 ESSENTIAL HYPERTENSION: Primary | ICD-10-CM

## 2022-02-10 DIAGNOSIS — E78.00 HYPERCHOLESTEREMIA: ICD-10-CM

## 2022-02-10 DIAGNOSIS — N18.30 STAGE 3 CHRONIC KIDNEY DISEASE, UNSPECIFIED WHETHER STAGE 3A OR 3B CKD (HCC): ICD-10-CM

## 2022-02-10 DIAGNOSIS — C61 PROSTATE CANCER (HCC): ICD-10-CM

## 2022-02-10 DIAGNOSIS — E55.9 VITAMIN D DEFICIENCY: ICD-10-CM

## 2022-02-10 DIAGNOSIS — D86.9 SARCOID: ICD-10-CM

## 2022-02-10 DIAGNOSIS — R73.9 BORDERLINE HYPERGLYCEMIA: ICD-10-CM

## 2022-02-10 PROCEDURE — 99214 OFFICE O/P EST MOD 30 MIN: CPT | Performed by: INTERNAL MEDICINE

## 2022-02-10 RX ORDER — CYCLOBENZAPRINE HCL 10 MG
10 TABLET ORAL
Qty: 30 TABLET | Refills: 1 | Status: SHIPPED | OUTPATIENT
Start: 2022-02-10 | End: 2022-08-16

## 2022-02-10 NOTE — PROGRESS NOTES
SUBJECTIVE:   Mr. Stacy Brown. is a 61 y.o. male who is here for follow up. Previously he saw Dr. Eliot Shrestha, and before that Dr. Jed Garcia. Chief Complaint   Patient presents with    Hypertension     6m f/u    Cholesterol Problem     Back pain stable. \"Comes and goes\"--flaring up a bit. Weight is down a bit:   Wt Readings from Last 3 Encounters:   02/10/22 255 lb (115.7 kg)   08/30/21 255 lb 3.2 oz (115.8 kg)   03/01/21 242 lb 3.2 oz (109.9 kg)     Prostate Cancer: Sees Dr. Iggy Degroot. He completed XRT in Feb 2014. He is no longer seeing Dr. Jami Quintero, for CKD--\"as needed if the labs get worse. \"   He saw Dr. Khoi Huddleston at one time and had PFT, due to sarcoid. This issue is quiescent. He had diagnosis years ago, with liver biopsy. He gets yearly eye check. At this time, he is otherwise doing well and has brought no other complaints to my attention today. For a list of the medical issues addressed today, see the assessment and plan below. PMH: Colon polypectomy 2018 Dr. Deanna Sarabia. Past Medical History:   Diagnosis Date    Arthritis     CKD (chronic kidney disease) 5/21/2014    Hypercholesterolemia     Hypertension     Increasing prostate specific antigen (PSA) level after treatment for malignant neoplasm of prostate 7/17/2020    After XRT his PSA went from 6.1 to 1.3. It stayed below 3 until 1/4/17. His PSA was increasingto 4.0 2/7/17. Rebiopsy of his prostate 2/17/17 revealed benign prostate tissue. 35cc gland. PSA 11.1 on 8/3/18. Repeat biopsy 9/18/18 revealed benign tissue. PSA 9/4/18 was 11.7. PSA 5/22/19 was 16.2 with 6.4% free. 6/6/19 Axumin PET did not reveal active disease. PSA 3/9/2020: 35.4 per PCP labs. PSA 6/3    Prostate cancer ([address] 5/21/2014    Sarcoid        Past Surgical History:   Procedure Laterality Date    HX CIRCUMCISION      HX HERNIA REPAIR      HX KNEE ARTHROSCOPY      HX PROSTATECTOMY      HX TONSILLECTOMY         All: He has No Known Allergies.    Current Outpatient Medications   Medication Sig    losartan-hydroCHLOROthiazide (HYZAAR) 50-12.5 mg per tablet TAKE 1 TABLET BY MOUTH EVERY DAY    simvastatin (ZOCOR) 10 mg tablet TAKE 1 TABLET BY MOUTH NIGHTLY    abiraterone 250 mg tab  (Patient not taking: Reported on 2/10/2022)    predniSONE (DELTASONE) 5 mg tablet TAKE 1 TABLET BY MOUTH EVERY DAY (Patient not taking: Reported on 2021)     No current facility-administered medications for this visit. FH: His mother  68 of lung cancer, HTN. Father  of lung cancer age 68. One sister  age 1 of heart defect. Sister and brother are alive and well. SH: Works for [employer] part time. 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 He reports that he has quit smoking. He quit after 1.00 year of use. He has never used smokeless tobacco. He reports current alcohol use. He reports that he does not use drugs. ROS: See above; Complete ROS otherwise negative. OBJECTIVE:   Vitals:   Visit Vitals  /79 (BP 1 Location: Left arm, BP Patient Position: Sitting, BP Cuff Size: Large adult)   Pulse 80   Temp 97.2 °F (36.2 °C) (Temporal)   Resp 18   Ht 6' 2\" (1.88 m)   Wt 255 lb (115.7 kg)   SpO2 98%   BMI 32.74 kg/m²      Gen: Pleasant 61 y.o. W male in [facility]. HEENT: PERRLA. EOMI. TM pearly bilaterally. OP moist and pink. Neck: Supple. No LAD. HEART: RRR, No M/G/R.    LUNGS: CTAB No W/R. ABDOMEN: S, NT, ND, BS+. EXTREMITIES: Warm. No C/C/E.  MUSCULOSKELETAL: Normal ROM, muscle strength 5/5 all groups. NEURO: Alert and oriented x 3. Cranial nerves grossly intact. No focal sensory or motor deficits noted. SKIN: Warm. Dry. No rashes or other lesions noted.       Lab Results   Component Value Date/Time    Sodium 142 2022 12:00 AM    Potassium 4.4 2022 12:00 AM    Chloride 102 2022 12:00 AM    CO2 24 2022 12:00 AM    Anion gap 8 2013 05:00 AM    Glucose 88 2022 12:00 AM    BUN 15 02/07/2022 12:00 AM    Creatinine 1.19 02/07/2022 12:00 AM    BUN/Creatinine ratio 13 02/07/2022 12:00 AM    GFR est AA 74 02/07/2022 12:00 AM    GFR est non-AA 64 02/07/2022 12:00 AM    Calcium 9.6 02/07/2022 12:00 AM    Bilirubin, total 0.8 02/07/2022 12:00 AM    ALT (SGPT) 28 02/07/2022 12:00 AM    Alk. phosphatase 131 (H) 02/07/2022 12:00 AM    Protein, total 7.5 02/07/2022 12:00 AM    Albumin 4.3 02/07/2022 12:00 AM    Globulin 5.5 (H) 07/31/2013 12:55 PM    A-G Ratio 1.3 02/07/2022 12:00 AM       Lab Results   Component Value Date/Time    Cholesterol, total 209 (H) 02/07/2022 12:00 AM    HDL Cholesterol 45 02/07/2022 12:00 AM    LDL,Direct 93 05/21/2014 12:26 PM    LDL, calculated 119 (H) 02/07/2022 12:00 AM    LDL, calculated 89 07/06/2020 10:22 AM    Triglyceride 259 (H) 02/07/2022 12:00 AM        Lab Results   Component Value Date/Time    Hemoglobin A1c 6.1 (H) 02/07/2022 12:00 AM       Lab Results   Component Value Date/Time    WBC 6.7 02/07/2022 12:00 AM    HGB 15.5 02/07/2022 12:00 AM    HCT 44.5 02/07/2022 12:00 AM    PLATELET 447 58/65/3244 12:00 AM    MCV 90 02/07/2022 12:00 AM         ASSESSMENT/ PLAN:   1. HTN (hypertension): BP high today. 2. Hypercholesteremia: Last labs look normal. No changes. 3. CKD (chronic kidney disease), stage 3 (moderate): GFR as above--normal.  Mild. Dr. Zhao Romo cut him loose. 4. Prostate cancer: s/p XRT and previous follow up Dr. Behzad Patel. Now seeing urologist, Dr. Annamarie Beltran again. 5. Knee pain, right:  Doing better. 6. Sarcoid: He has seen Dr. Tiffany Kumar. 7. Hyperglycemia / prediabetes: Watch diet and exercise. 8. Colon cancer screening/polyps: Per Dr. José Miguel Juarez. 9. Back pain:     Follow up in about 6 months.

## 2022-02-10 NOTE — PROGRESS NOTES
Chief Complaint   Patient presents with    Hypertension     6m f/u    Cholesterol Problem       1. Have you been to the ER, urgent care clinic since your last visit? Hospitalized since your last visit? No    2. Have you seen or consulted any other health care providers outside of the [address] [place] [address] since your last visit? Include any pap smears or colon screening.  No socks/independent

## 2022-03-18 PROBLEM — R97.21 INCREASING PROSTATE SPECIFIC ANTIGEN (PSA) LEVEL AFTER TREATMENT FOR MALIGNANT NEOPLASM OF PROSTATE: Status: ACTIVE | Noted: 2020-07-17
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
## 2022-08-11 ENCOUNTER — APPOINTMENT (OUTPATIENT)
Dept: INTERNAL MEDICINE CLINIC | Age: 67
End: 2022-08-11

## 2022-08-15 LAB
25(OH)D3+25(OH)D2 SERPL-MCNC: 52.8 NG/ML (ref 30–100)
ALBUMIN SERPL-MCNC: 4.4 G/DL (ref 3.8–4.8)
ALBUMIN/GLOB SERPL: 1.5 {RATIO} (ref 1.2–2.2)
ALP SERPL-CCNC: 124 IU/L (ref 44–121)
ALT SERPL-CCNC: 20 IU/L (ref 0–44)
AST SERPL-CCNC: 21 IU/L (ref 0–40)
BASOPHILS # BLD AUTO: 0 X10E3/UL (ref 0–0.2)
BASOPHILS NFR BLD AUTO: 0 %
BILIRUB SERPL-MCNC: 0.8 MG/DL (ref 0–1.2)
BUN SERPL-MCNC: 14 MG/DL (ref 8–27)
BUN/CREAT SERPL: 12 (ref 10–24)
CALCIUM SERPL-MCNC: 9.4 MG/DL (ref 8.6–10.2)
CHLORIDE SERPL-SCNC: 101 MMOL/L (ref 96–106)
CHOLEST SERPL-MCNC: 233 MG/DL (ref 100–199)
CO2 SERPL-SCNC: 21 MMOL/L (ref 20–29)
CREAT SERPL-MCNC: 1.19 MG/DL (ref 0.76–1.27)
EGFR: 68 ML/MIN/1.73
EOSINOPHIL # BLD AUTO: 0.1 X10E3/UL (ref 0–0.4)
EOSINOPHIL NFR BLD AUTO: 1 %
ERYTHROCYTE [DISTWIDTH] IN BLOOD BY AUTOMATED COUNT: 13.5 % (ref 11.6–15.4)
EST. AVERAGE GLUCOSE BLD GHB EST-MCNC: 126 MG/DL
GLOBULIN SER CALC-MCNC: 2.9 G/DL (ref 1.5–4.5)
GLUCOSE SERPL-MCNC: 91 MG/DL (ref 65–99)
HBA1C MFR BLD: 6 % (ref 4.8–5.6)
HCT VFR BLD AUTO: 46.5 % (ref 37.5–51)
HDLC SERPL-MCNC: 49 MG/DL
HGB BLD-MCNC: 15.7 G/DL (ref 13–17.7)
IMM GRANULOCYTES # BLD AUTO: 0 X10E3/UL (ref 0–0.1)
IMM GRANULOCYTES NFR BLD AUTO: 0 %
LDLC SERPL CALC-MCNC: 155 MG/DL (ref 0–99)
LYMPHOCYTES # BLD AUTO: 1.6 X10E3/UL (ref 0.7–3.1)
LYMPHOCYTES NFR BLD AUTO: 21 %
MCH RBC QN AUTO: 31.6 PG (ref 26.6–33)
MCHC RBC AUTO-ENTMCNC: 33.8 G/DL (ref 31.5–35.7)
MCV RBC AUTO: 94 FL (ref 79–97)
MONOCYTES # BLD AUTO: 0.5 X10E3/UL (ref 0.1–0.9)
MONOCYTES NFR BLD AUTO: 7 %
NEUTROPHILS # BLD AUTO: 5.1 X10E3/UL (ref 1.4–7)
NEUTROPHILS NFR BLD AUTO: 71 %
PLATELET # BLD AUTO: 179 X10E3/UL (ref 150–450)
POTASSIUM SERPL-SCNC: 4.4 MMOL/L (ref 3.5–5.2)
PROT SERPL-MCNC: 7.3 G/DL (ref 6–8.5)
PSA SERPL-MCNC: 0.6 NG/ML (ref 0–4)
RBC # BLD AUTO: 4.97 X10E6/UL (ref 4.14–5.8)
REFLEX CRITERIA: NORMAL
SODIUM SERPL-SCNC: 140 MMOL/L (ref 134–144)
TRIGL SERPL-MCNC: 161 MG/DL (ref 0–149)
VLDLC SERPL CALC-MCNC: 29 MG/DL (ref 5–40)
WBC # BLD AUTO: 7.4 X10E3/UL (ref 3.4–10.8)

## 2022-08-16 ENCOUNTER — OFFICE VISIT (OUTPATIENT)
Dept: INTERNAL MEDICINE CLINIC | Age: 67
End: 2022-08-16

## 2022-08-16 VITALS
DIASTOLIC BLOOD PRESSURE: 71 MMHG | WEIGHT: 246.6 LBS | SYSTOLIC BLOOD PRESSURE: 103 MMHG | HEIGHT: 74 IN | HEART RATE: 85 BPM | TEMPERATURE: 98.2 F | BODY MASS INDEX: 31.65 KG/M2 | OXYGEN SATURATION: 96 % | RESPIRATION RATE: 16 BRPM

## 2022-08-16 DIAGNOSIS — R73.9 BORDERLINE HYPERGLYCEMIA: ICD-10-CM

## 2022-08-16 DIAGNOSIS — C61 PROSTATE CANCER (HCC): ICD-10-CM

## 2022-08-16 DIAGNOSIS — E78.00 HYPERCHOLESTEREMIA: ICD-10-CM

## 2022-08-16 DIAGNOSIS — I10 ESSENTIAL HYPERTENSION: Primary | ICD-10-CM

## 2022-08-16 DIAGNOSIS — E55.9 VITAMIN D DEFICIENCY: ICD-10-CM

## 2022-08-16 DIAGNOSIS — N18.30 STAGE 3 CHRONIC KIDNEY DISEASE, UNSPECIFIED WHETHER STAGE 3A OR 3B CKD (HCC): ICD-10-CM

## 2022-08-16 PROCEDURE — G8427 DOCREV CUR MEDS BY ELIG CLIN: HCPCS | Performed by: INTERNAL MEDICINE

## 2022-08-16 PROCEDURE — G8752 SYS BP LESS 140: HCPCS | Performed by: INTERNAL MEDICINE

## 2022-08-16 PROCEDURE — G8754 DIAS BP LESS 90: HCPCS | Performed by: INTERNAL MEDICINE

## 2022-08-16 PROCEDURE — G8536 NO DOC ELDER MAL SCRN: HCPCS | Performed by: INTERNAL MEDICINE

## 2022-08-16 PROCEDURE — 3017F COLORECTAL CA SCREEN DOC REV: CPT | Performed by: INTERNAL MEDICINE

## 2022-08-16 PROCEDURE — G8510 SCR DEP NEG, NO PLAN REQD: HCPCS | Performed by: INTERNAL MEDICINE

## 2022-08-16 PROCEDURE — G8417 CALC BMI ABV UP PARAM F/U: HCPCS | Performed by: INTERNAL MEDICINE

## 2022-08-16 PROCEDURE — 1101F PT FALLS ASSESS-DOCD LE1/YR: CPT | Performed by: INTERNAL MEDICINE

## 2022-08-16 PROCEDURE — 99214 OFFICE O/P EST MOD 30 MIN: CPT | Performed by: INTERNAL MEDICINE

## 2022-08-16 RX ORDER — SIMVASTATIN 10 MG/1
TABLET, FILM COATED ORAL
Qty: 90 TABLET | Refills: 3 | Status: SHIPPED | OUTPATIENT
Start: 2022-08-16

## 2022-08-16 RX ORDER — LOSARTAN POTASSIUM AND HYDROCHLOROTHIAZIDE 12.5; 5 MG/1; MG/1
1 TABLET ORAL DAILY
Qty: 90 TABLET | Refills: 3 | Status: SHIPPED | OUTPATIENT
Start: 2022-08-16

## 2022-08-16 NOTE — PROGRESS NOTES
SUBJECTIVE:   Mr. Jose Luis Kelly is a 74 y.o. male who is here for follow up. Previously he saw Dr. Rachel Alexander, and before that Dr. Poppy Huggins. No chief complaint on file. Back pain stable. \"Comes and goes\"--OK at this time. Weight is down a bit:   Wt Readings from Last 3 Encounters:   08/16/22 246 lb 9.6 oz (111.9 kg)   02/10/22 255 lb (115.7 kg)   08/30/21 255 lb 3.2 oz (115.8 kg)     Prostate Cancer: Sees Dr. Mandy Earl. He completed XRT in Feb 2014. He is no longer seeing Dr. Essence Cota, for CKD--\"as needed if the labs get worse. \"     He saw Dr. Ofelia Izquierdo at one time and had PFT, due to sarcoid. This issue is quiescent. He had diagnosis years ago, with liver biopsy. He gets yearly eye check. At this time, he is otherwise doing well and has brought no other complaints to my attention today. For a list of the medical issues addressed today, see the assessment and plan below. PMH: Colon polypectomy 2018 Dr. Kwesi Gruber. Past Medical History:   Diagnosis Date    Arthritis     CKD (chronic kidney disease) 5/21/2014    Hypercholesterolemia     Hypertension     Increasing prostate specific antigen (PSA) level after treatment for malignant neoplasm of prostate 7/17/2020    After XRT his PSA went from 6.1 to 1.3. It stayed below 3 until 1/4/17. His PSA was increasingto 4.0 2/7/17. Rebiopsy of his prostate 2/17/17 revealed benign prostate tissue. 35cc gland. PSA 11.1 on 8/3/18. Repeat biopsy 9/18/18 revealed benign tissue. PSA 9/4/18 was 11.7. PSA 5/22/19 was 16.2 with 6.4% free. 6/6/19 Axumin PET did not reveal active disease. PSA 3/9/2020: 35.4 per PCP labs. PSA 6/3    Prostate cancer ([address] 5/21/2014    Sarcoid        Past Surgical History:   Procedure Laterality Date    HX CIRCUMCISION      HX HERNIA REPAIR      HX KNEE ARTHROSCOPY      HX PROSTATECTOMY      HX TONSILLECTOMY         All: He has No Known Allergies.    Current Outpatient Medications   Medication Sig    losartan-hydroCHLOROthiazide (HYZAAR) 50-12.5 mg per tablet TAKE 1 TABLET BY MOUTH EVERY DAY    simvastatin (ZOCOR) 10 mg tablet TAKE 1 TABLET BY MOUTH NIGHTLY    cyclobenzaprine (FLEXERIL) 10 mg tablet Take 1 Tablet by mouth three (3) times daily as needed for Muscle Spasm(s). (Patient not taking: Reported on 2022)     No current facility-administered medications for this visit. FH: His mother  68 of lung cancer, HTN. Father  of lung cancer age 68. One sister  age 1 of heart defect. Sister and brother are alive and well. SH: Works for [employer] part time. 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 He reports that he has quit smoking. His smoking use included cigarettes. He has never used smokeless tobacco. He reports current alcohol use. He reports that he does not use drugs. ROS: See above; Complete ROS otherwise negative. OBJECTIVE:   Vitals:   Visit Vitals  /71 (BP 1 Location: Left upper arm, BP Patient Position: Sitting, BP Cuff Size: Large adult)   Pulse 85   Temp 98.2 °F (36.8 °C) (Temporal)   Resp 16   Ht 6' 2\" (1.88 m)   Wt 246 lb 9.6 oz (111.9 kg)   SpO2 96%   BMI 31.66 kg/m²      Gen: Pleasant 74 y.o. W male in NAD. HEENT: PERRLA. EOMI. TM pearly bilaterally. OP moist and pink. Neck: Supple. No LAD. HEART: RRR, No M/G/R.    LUNGS: CTAB No W/R. ABDOMEN: S, NT, ND, BS+. EXTREMITIES: Warm. No C/C/E.  MUSCULOSKELETAL: Normal ROM, muscle strength 5/5 all groups. NEURO: Alert and oriented x 3. Cranial nerves grossly intact. No focal sensory or motor deficits noted. SKIN: Warm. Dry. No rashes or other lesions noted.       Lab Results   Component Value Date/Time    Sodium 140 2022 12:00 AM    Potassium 4.4 2022 12:00 AM    Chloride 101 2022 12:00 AM    CO2 21 2022 12:00 AM    Anion gap 8 2013 05:00 AM    Glucose 91 2022 12:00 AM    BUN 14 2022 12:00 AM    Creatinine 1.19 2022 12:00 AM    BUN/Creatinine ratio 12 08/11/2022 12:00 AM    GFR est AA 74 02/07/2022 12:00 AM    GFR est non-AA 64 02/07/2022 12:00 AM    Calcium 9.4 08/11/2022 12:00 AM    Bilirubin, total 0.8 08/11/2022 12:00 AM    ALT (SGPT) 20 08/11/2022 12:00 AM    Alk. phosphatase 124 (H) 08/11/2022 12:00 AM    Protein, total 7.3 08/11/2022 12:00 AM    Albumin 4.4 08/11/2022 12:00 AM    Globulin 5.5 (H) 07/31/2013 12:55 PM    A-G Ratio 1.5 08/11/2022 12:00 AM       Lab Results   Component Value Date/Time    Cholesterol, total 233 (H) 08/11/2022 12:00 AM    HDL Cholesterol 49 08/11/2022 12:00 AM    LDL,Direct 93 05/21/2014 12:26 PM    LDL, calculated 155 (H) 08/11/2022 12:00 AM    LDL, calculated 89 07/06/2020 10:22 AM    Triglyceride 161 (H) 08/11/2022 12:00 AM        Lab Results   Component Value Date/Time    Hemoglobin A1c 6.0 (H) 08/11/2022 12:00 AM       Lab Results   Component Value Date/Time    WBC 7.4 08/11/2022 12:00 AM    HGB 15.7 08/11/2022 12:00 AM    HCT 46.5 08/11/2022 12:00 AM    PLATELET 048 67/15/4106 12:00 AM    MCV 94 08/11/2022 12:00 AM         ASSESSMENT/ PLAN:   HTN (hypertension): BP normal today. Hypercholesteremia: High LDL. Not taking simvastatin often. No changes. CKD (chronic kidney disease), stage 3 (moderate): GFR as above--normal.  Mild. Dr. Roselia Peterson cut him loose. Prostate cancer: s/p XRT and previous follow up Dr. Mohan Day. Now seeing urologist, Dr. Leslee Sanders again. Knee pain, right:  Doing better. Sarcoid: He has seen Dr. Guru Briceno. Hyperglycemia / prediabetes: Watch diet and exercise. Colon cancer screening/polyps: Per Dr. Tammy Soto. Follow up in about 6 months.

## 2022-08-16 NOTE — PROGRESS NOTES
1. \"Have you been to the ER, urgent care clinic since your last visit? Hospitalized since your last visit? \" No    2. \"Have you seen or consulted any other health care providers outside of the [address] [place] [address] since your last visit? \" No     3. For patients aged 39-70: Has the patient had a colonoscopy / FIT/ Cologuard?  Yes - no Care Gap present

## 2023-02-16 ENCOUNTER — OFFICE VISIT (OUTPATIENT)
Dept: INTERNAL MEDICINE CLINIC | Age: 68
End: 2023-02-16

## 2023-02-16 VITALS
RESPIRATION RATE: 16 BRPM | TEMPERATURE: 97.9 F | HEIGHT: 74 IN | WEIGHT: 248 LBS | SYSTOLIC BLOOD PRESSURE: 110 MMHG | OXYGEN SATURATION: 97 % | BODY MASS INDEX: 31.83 KG/M2 | HEART RATE: 80 BPM | DIASTOLIC BLOOD PRESSURE: 72 MMHG

## 2023-02-16 DIAGNOSIS — N18.30 STAGE 3 CHRONIC KIDNEY DISEASE, UNSPECIFIED WHETHER STAGE 3A OR 3B CKD (HCC): ICD-10-CM

## 2023-02-16 DIAGNOSIS — Z00.00 WELCOME TO MEDICARE PREVENTIVE VISIT: Primary | ICD-10-CM

## 2023-02-16 DIAGNOSIS — R07.89 CHEST WALL PAIN: ICD-10-CM

## 2023-02-16 DIAGNOSIS — C61 PROSTATE CANCER (HCC): ICD-10-CM

## 2023-02-16 DIAGNOSIS — E78.00 HYPERCHOLESTEREMIA: ICD-10-CM

## 2023-02-16 RX ORDER — SIMVASTATIN 10 MG/1
TABLET, FILM COATED ORAL
Qty: 90 TABLET | Refills: 3 | Status: SHIPPED | OUTPATIENT
Start: 2023-02-16

## 2023-02-16 RX ORDER — LOSARTAN POTASSIUM AND HYDROCHLOROTHIAZIDE 12.5; 5 MG/1; MG/1
1 TABLET ORAL DAILY
Qty: 90 TABLET | Refills: 3 | Status: SHIPPED | OUTPATIENT
Start: 2023-02-16

## 2023-02-16 NOTE — PROGRESS NOTES
SUBJECTIVE:   Mr. Chapo Montes De Oca is a 74 y.o. male who is here for follow up. Previously he saw Dr. Kira Kieth, and before that Dr. Korey Monreal. Chief Complaint   Patient presents with    Annual Wellness Visit    Chest Pain       He has had some chest discomfort, associated with weight lifting (otherwise good exercise tolerance), located in medial left pectoralis major/lateral sternum. He requests CXR. Back pain stable. \"Comes and goes\"--OK at this time. Weight is down a bit:   Wt Readings from Last 3 Encounters:   02/16/23 248 lb (112.5 kg)   08/16/22 246 lb 9.6 oz (111.9 kg)   02/10/22 255 lb (115.7 kg)     Prostate Cancer: Sees Dr. Shane Rivera. He completed XRT in Feb 2014. He is no longer seeing Dr. Aracelis Mcleod, for CKD--\"as needed if the labs get worse. \"     He saw Dr. Felix Rojas at one time and had PFT, due to sarcoid. This issue is quiescent. He had diagnosis years ago, with liver biopsy. He gets yearly eye check. At this time, he is otherwise doing well and has brought no other complaints to my attention today. For a list of the medical issues addressed today, see the assessment and plan below. PMH: Colon polypectomy 2018 Dr. Betsy Helton. Past Medical History:   Diagnosis Date    Arthritis     CKD (chronic kidney disease) 5/21/2014    Hypercholesterolemia     Hypertension     Increasing prostate specific antigen (PSA) level after treatment for malignant neoplasm of prostate 7/17/2020    After XRT his PSA went from 6.1 to 1.3. It stayed below 3 until 1/4/17. His PSA was increasingto 4.0 2/7/17. Rebiopsy of his prostate 2/17/17 revealed benign prostate tissue. 35cc gland. PSA 11.1 on 8/3/18. Repeat biopsy 9/18/18 revealed benign tissue. PSA 9/4/18 was 11.7. PSA 5/22/19 was 16.2 with 6.4% free. 6/6/19 Axumin PET did not reveal active disease. PSA 3/9/2020: 35.4 per PCP labs.  PSA 6/3    Prostate cancer ([address] 5/21/2014    Sarcoid        Past Surgical History:   Procedure Laterality Date    HX CIRCUMCISION      HX HERNIA REPAIR      HX KNEE ARTHROSCOPY      HX PROSTATECTOMY      HX TONSILLECTOMY         All: He has No Known Allergies. Current Outpatient Medications   Medication Sig    losartan-hydroCHLOROthiazide (HYZAAR) 50-12.5 mg per tablet Take 1 Tablet by mouth daily. simvastatin (ZOCOR) 10 mg tablet TAKE 1 TABLET BY MOUTH NIGHTLY     No current facility-administered medications for this visit. FH: His mother  68 of lung cancer, HTN. Father  of lung cancer age 68. One sister  age 1 of heart defect. Sister and brother are alive and well. SH: Works for [employer] part time. 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 He reports that he quit smoking about 51 years ago. His smoking use included cigarettes. He has a 0.25 pack-year smoking history. He has never used smokeless tobacco. He reports current alcohol use. He reports that he does not use drugs. ROS: See above; Complete ROS otherwise negative. OBJECTIVE:   Vitals:   Visit Vitals  /72 (BP 1 Location: Left upper arm, BP Patient Position: Sitting)   Pulse 80   Temp 97.9 °F (36.6 °C) (Temporal)   Resp 16   Ht 6' 2\" (1.88 m)   Wt 248 lb (112.5 kg)   SpO2 97%   BMI 31.84 kg/m²      Gen: Pleasant 74 y.o. W male in NAD. HEENT: PERRLA. EOMI. TM pearly bilaterally. OP moist and pink. Neck: Supple. No LAD. HEART: RRR, No M/G/R.    LUNGS: CTAB No W/R. ABDOMEN: S, NT, ND, BS+. EXTREMITIES: Warm. No C/C/E.  MUSCULOSKELETAL: Normal ROM, muscle strength 5/5 all groups. NEURO: Alert and oriented x 3. Cranial nerves grossly intact. No focal sensory or motor deficits noted. SKIN: Warm. Dry. No rashes or other lesions noted.       Lab Results   Component Value Date/Time    Sodium 140 2022 12:00 AM    Potassium 4.4 2022 12:00 AM    Chloride 101 2022 12:00 AM    CO2 21 2022 12:00 AM    Anion gap 8 2013 05:00 AM    Glucose 91 2022 12:00 AM    BUN 14 08/11/2022 12:00 AM    Creatinine 1.19 08/11/2022 12:00 AM    BUN/Creatinine ratio 12 08/11/2022 12:00 AM    GFR est AA 74 02/07/2022 12:00 AM    GFR est non-AA 64 02/07/2022 12:00 AM    Calcium 9.4 08/11/2022 12:00 AM    Bilirubin, total 0.8 08/11/2022 12:00 AM    ALT (SGPT) 20 08/11/2022 12:00 AM    Alk. phosphatase 124 (H) 08/11/2022 12:00 AM    Protein, total 7.3 08/11/2022 12:00 AM    Albumin 4.4 08/11/2022 12:00 AM    Globulin 5.5 (H) 07/31/2013 12:55 PM    A-G Ratio 1.5 08/11/2022 12:00 AM       Lab Results   Component Value Date/Time    Cholesterol, total 233 (H) 08/11/2022 12:00 AM    HDL Cholesterol 49 08/11/2022 12:00 AM    LDL,Direct 93 05/21/2014 12:26 PM    LDL, calculated 155 (H) 08/11/2022 12:00 AM    LDL, calculated 89 07/06/2020 10:22 AM    Triglyceride 161 (H) 08/11/2022 12:00 AM        Lab Results   Component Value Date/Time    Hemoglobin A1c 6.0 (H) 08/11/2022 12:00 AM       Lab Results   Component Value Date/Time    WBC 7.4 08/11/2022 12:00 AM    HGB 15.7 08/11/2022 12:00 AM    HCT 46.5 08/11/2022 12:00 AM    PLATELET 335 50/88/4674 12:00 AM    MCV 94 08/11/2022 12:00 AM         ASSESSMENT/ PLAN:   HTN (hypertension): BP normal today. Hypercholesteremia: High LDL. Not taking simvastatin often. No changes. CKD (chronic kidney disease), stage 3 (moderate): GFR as above--normal.  Mild. Dr. Laina Cordova cut him loose. Prostate cancer: s/p XRT and previous follow up Dr. Sintia Florian. Now seeing urologist, Dr. Angie Thayer again. Knee pain, right:  Doing better. Sarcoid: He has seen Dr. Adrian Nelson. Hyperglycemia / prediabetes: Watch diet and exercise. Colon cancer screening/polyps: Per Dr. Don Calvin. Follow up in about 6 months.

## 2023-02-16 NOTE — PATIENT INSTRUCTIONS
Medicare Wellness Visit, Male    The best way to live healthy is to have a lifestyle where you eat a well-balanced diet, exercise regularly, limit alcohol use, and quit all forms of tobacco/nicotine, if applicable. Regular preventive services are another way to keep healthy. Preventive services (vaccines, screening tests, monitoring & exams) can help personalize your care plan, which helps you manage your own care. Screening tests can find health problems at the earliest stages, when they are easiest to treat. [facility] follows the current, evidence-based guidelines published by the [facility] ([facility]) when recommending preventive services for our patients. Because we follow these guidelines, sometimes recommendations change over time as research supports it. (For example, a prostate screening blood test is no longer routinely recommended for men with no symptoms). Of course, you and your doctor may decide to screen more often for some diseases, based on your risk and co-morbidities (chronic disease you are already diagnosed with). Preventive services for you include:  - Medicare offers their members a free annual wellness visit, which is time for you and your primary care provider to discuss and plan for your preventive service needs.  Take advantage of this benefit every year!    -Over the age of 72 should receive the recommended pneumonia vaccines.   -All adults should have a flu vaccine yearly.  -All adults should receive a tetanus vaccine every 10 years.  -Over the age of 48 should receive the shingles vaccines.    -All adults should be screened once for Hepatitis C.  -All adults age 38-68 who are overweight should have a diabetes screening test once every three years.   -Other screening tests & preventive services for persons with diabetes include: an eye exam to screen for diabetic retinopathy, a kidney function test, a foot exam, and stricter control over your cholesterol.   -Cardiovascular screening for adults with routine risk involves an electrocardiogram (ECG) at intervals determined by the provider.     -Colorectal cancer screening should be done for adults age 43-69 with no increased risk factors for colorectal cancer. There are a number of acceptable methods of screening for this type of cancer. Each test has its own benefits and drawbacks. Discuss with your provider what is most appropriate for you during your annual wellness visit. The different tests include: colonoscopy (considered the best screening method), a fecal occult blood test, a fecal DNA test, and sigmoidoscopy.    -Lung cancer screening is recommended annually with a low dose CT scan for adults between age 54 and 68, who have smoked at least 30 pack years (equivalent of 1 pack per day for 30 days), and who is a current smoker or quit less than 15 years ago. -An Abdominal Aortic Aneurysm (AAA) Screening is recommended for men age 73-68 who has ever smoked in their lifetime.      Here is a list of your current Health Maintenance items (your personalized list of preventive services) with a due date:  Health Maintenance Due   Topic Date Due    DTaP/Tdap/Td  (1 - Tdap) Never done    Shingles Vaccine (1 of 2) Never done    COVID-19 Vaccine (5 - Booster for Erickson Peter series) 01/25/2023

## 2023-02-16 NOTE — PROGRESS NOTES
1. \"Have you been to the ER, urgent care clinic since your last visit? Hospitalized since your last visit? \" No    2. \"Have you seen or consulted any other health care providers outside of the [address] [place] [address] since your last visit? \" No     3. For patients aged 39-70: Has the patient had a colonoscopy / FIT/ Cologuard? Yes - Care Gap present.  Most recent result on file

## 2023-02-16 NOTE — PROGRESS NOTES
Wellness: This is a \"Welcome to [employer]"  Initial Preventive Physical Examination (IPPE) providing Personalized Prevention Plan Services (Performed in the first 12 months of enrollment)    I have reviewed the patient's medical history in detail and updated the computerized patient record. Assessment/Plan   Education and counseling provided:  Are appropriate based on today's review and evaluation    1. Hypercholesteremia  -     simvastatin (ZOCOR) 10 mg tablet; TAKE 1 TABLET BY MOUTH NIGHTLY, Normal, Disp-90 Tablet, R-3       Depression Risk Screen     3 most recent PHQ Screens 2/16/2023   Little interest or pleasure in doing things Not at all   Feeling down, depressed, irritable, or hopeless Not at all   Total Score PHQ 2 0       Alcohol & Drug Abuse Risk Screen    Do you average more than 1 drink per night or more than 7 drinks a week: No    In the past three months have you have had more than 4 drinks containing alcohol on one occasion: No          Functional Ability and Level of Safety    Diet: No special diet      Hearing: Hearing is good. Vision Screening:  Vision is good. See nurse note on Snellen Exam  No results found. Activities of Daily Living: The home contains: no safety equipment. Patient does total self care      Ambulation: with difficulty, uses a cane at times. Exercise level: moderately active     Fall Risk Screen:  Fall Risk Assessment, last 12 mths 8/16/2022   Able to walk? Yes   Fall in past 12 months? 0   Do you feel unsteady? 0   Are you worried about falling 0      Abuse Screen:  Patient is not abused       Screening EKG   EKG order placed: Yes    End of Life Planning   Advanced care planning directives were discussed with the patient and /or family/caregiver.      Health Maintenance Due     Health Maintenance Due   Topic Date Due    DTaP/Tdap/Td series (1 - Tdap) Never done    Shingles Vaccine (1 of 2) Never done    Medicare Yearly Exam  Never done    COVID-19 Vaccine (5 - Booster for Dre Dugan series) 2023       Patient Care Team   Patient Care Team:  Annalee Salazar MD as PCP - General (Internal Medicine Physician)  Annalee Salazar MD as PCP - [facility]    History     Past Medical History:   Diagnosis Date    Arthritis     CKD (chronic kidney disease) 2014    Hypercholesterolemia     Hypertension     Increasing prostate specific antigen (PSA) level after treatment for malignant neoplasm of prostate 2020    After XRT his PSA went from 6.1 to 1.3. It stayed below 3 until 17. His PSA was increasingto 4.0 17. Rebiopsy of his prostate 17 revealed benign prostate tissue. 35cc gland. PSA 11.1 on 8/3/18. Repeat biopsy 18 revealed benign tissue. PSA 18 was 11.7. PSA 19 was 16.2 with 6.4% free. 19 Axumin PET did not reveal active disease. PSA 3/9/2020: 35.4 per PCP labs. PSA 6/3    Prostate cancer ([address] 2014    Sarcoid       Past Surgical History:   Procedure Laterality Date    HX CIRCUMCISION      HX HERNIA REPAIR      HX KNEE ARTHROSCOPY      HX PROSTATECTOMY      HX TONSILLECTOMY       Current Outpatient Medications   Medication Sig Dispense Refill    losartan-hydroCHLOROthiazide (HYZAAR) 50-12.5 mg per tablet Take 1 Tablet by mouth daily.  90 Tablet 3    simvastatin (ZOCOR) 10 mg tablet TAKE 1 TABLET BY MOUTH NIGHTLY 90 Tablet 3     No Known Allergies    Family History   Problem Relation Age of Onset    Cancer Mother         Lung Cancer and Brain    Hypertension Mother     Cancer Father 68        Lung Cancer     Social History     Tobacco Use    Smoking status: Former     Packs/day: 0.25     Years: 1.00     Pack years: 0.25     Types: Cigarettes     Quit date:      Years since quittin.1    Smokeless tobacco: Never   Substance Use Topics    Alcohol use: Yes       Heidi Maguire MD

## 2023-02-17 ENCOUNTER — APPOINTMENT (OUTPATIENT)
Dept: INTERNAL MEDICINE CLINIC | Age: 68
End: 2023-02-17
Payer: MEDICARE

## 2023-02-17 ENCOUNTER — HOSPITAL ENCOUNTER (OUTPATIENT)
Dept: GENERAL RADIOLOGY | Age: 68
Discharge: HOME | End: 2023-02-17
Payer: MEDICARE

## 2023-02-17 DIAGNOSIS — E55.9 VITAMIN D DEFICIENCY: ICD-10-CM

## 2023-02-17 DIAGNOSIS — C61 PROSTATE CANCER (HCC): ICD-10-CM

## 2023-02-17 DIAGNOSIS — R73.9 BORDERLINE HYPERGLYCEMIA: ICD-10-CM

## 2023-02-17 DIAGNOSIS — R07.89 CHEST WALL PAIN: ICD-10-CM

## 2023-02-17 DIAGNOSIS — I10 ESSENTIAL HYPERTENSION: ICD-10-CM

## 2023-02-17 DIAGNOSIS — N18.30 STAGE 3 CHRONIC KIDNEY DISEASE, UNSPECIFIED WHETHER STAGE 3A OR 3B CKD (HCC): ICD-10-CM

## 2023-02-17 DIAGNOSIS — E78.00 HYPERCHOLESTEREMIA: ICD-10-CM

## 2023-02-17 PROCEDURE — 71046 X-RAY EXAM CHEST 2 VIEWS: CPT

## 2023-02-18 LAB
25(OH)D3 SERPL-MCNC: 57.3 NG/ML (ref 30–100)
ALBUMIN SERPL-MCNC: 3.6 G/DL (ref 3.5–5)
ALBUMIN/GLOB SERPL: 1.1 (ref 1.1–2.2)
ALP SERPL-CCNC: 129 U/L (ref 45–117)
ALT SERPL-CCNC: 34 U/L (ref 12–78)
ANION GAP SERPL CALC-SCNC: 5 MMOL/L (ref 5–15)
AST SERPL-CCNC: 24 U/L (ref 15–37)
BASOPHILS # BLD: 0 K/UL (ref 0–0.1)
BASOPHILS NFR BLD: 1 % (ref 0–1)
BILIRUB SERPL-MCNC: 0.6 MG/DL (ref 0.2–1)
BUN SERPL-MCNC: 17 MG/DL (ref 6–20)
BUN/CREAT SERPL: 13 (ref 12–20)
CALCIUM SERPL-MCNC: 9.1 MG/DL (ref 8.5–10.1)
CHLORIDE SERPL-SCNC: 107 MMOL/L (ref 97–108)
CHOLEST SERPL-MCNC: 185 MG/DL
CO2 SERPL-SCNC: 29 MMOL/L (ref 21–32)
CREAT SERPL-MCNC: 1.34 MG/DL (ref 0.7–1.3)
DIFFERENTIAL METHOD BLD: NORMAL
EOSINOPHIL # BLD: 0.1 K/UL (ref 0–0.4)
EOSINOPHIL NFR BLD: 2 % (ref 0–7)
ERYTHROCYTE [DISTWIDTH] IN BLOOD BY AUTOMATED COUNT: 13.1 % (ref 11.5–14.5)
EST. AVERAGE GLUCOSE BLD GHB EST-MCNC: 120 MG/DL
GLOBULIN SER CALC-MCNC: 3.3 G/DL (ref 2–4)
GLUCOSE SERPL-MCNC: 103 MG/DL (ref 65–100)
HBA1C MFR BLD: 5.8 % (ref 4–5.6)
HCT VFR BLD AUTO: 43.4 % (ref 36.6–50.3)
HDLC SERPL-MCNC: 46 MG/DL
HDLC SERPL: 4 (ref 0–5)
HGB BLD-MCNC: 14.7 G/DL (ref 12.1–17)
IMM GRANULOCYTES # BLD AUTO: 0 K/UL (ref 0–0.04)
IMM GRANULOCYTES NFR BLD AUTO: 0 % (ref 0–0.5)
LDLC SERPL CALC-MCNC: 116.6 MG/DL (ref 0–100)
LYMPHOCYTES # BLD: 1.7 K/UL (ref 0.8–3.5)
LYMPHOCYTES NFR BLD: 27 % (ref 12–49)
MCH RBC QN AUTO: 31.9 PG (ref 26–34)
MCHC RBC AUTO-ENTMCNC: 33.9 G/DL (ref 30–36.5)
MCV RBC AUTO: 94.1 FL (ref 80–99)
MONOCYTES # BLD: 0.6 K/UL (ref 0–1)
MONOCYTES NFR BLD: 9 % (ref 5–13)
NEUTS SEG # BLD: 3.9 K/UL (ref 1.8–8)
NEUTS SEG NFR BLD: 61 % (ref 32–75)
NRBC # BLD: 0 K/UL (ref 0–0.01)
NRBC BLD-RTO: 0 PER 100 WBC
PLATELET # BLD AUTO: 161 K/UL (ref 150–400)
PMV BLD AUTO: 11.9 FL (ref 8.9–12.9)
POTASSIUM SERPL-SCNC: 4.2 MMOL/L (ref 3.5–5.1)
PROT SERPL-MCNC: 6.9 G/DL (ref 6.4–8.2)
RBC # BLD AUTO: 4.61 M/UL (ref 4.1–5.7)
SODIUM SERPL-SCNC: 141 MMOL/L (ref 136–145)
TRIGL SERPL-MCNC: 112 MG/DL (ref ?–150)
VLDLC SERPL CALC-MCNC: 22.4 MG/DL
WBC # BLD AUTO: 6.3 K/UL (ref 4.1–11.1)

## 2023-02-19 LAB
PSA SERPL-MCNC: 1.5 NG/ML (ref 0–4)
REFLEX CRITERIA: NORMAL

## 2023-02-21 ENCOUNTER — TELEPHONE (OUTPATIENT)
Dept: INTERNAL MEDICINE CLINIC | Age: 68
End: 2023-02-21

## 2023-02-22 NOTE — PROGRESS NOTES
Spoke with 2 identifiers. Notified the patient Dr. Mariam Yun states chest xray results are normal.    Patient verbalized understanding.     Birgit Loveless, CMA

## 2023-05-09 NOTE — TELEPHONE ENCOUNTER
Pt is asking for lab result of PSA level and to have all lab results mailed to his home. Thanks. Admission

## 2023-09-21 ENCOUNTER — OFFICE VISIT (OUTPATIENT)
Age: 68
End: 2023-09-21
Payer: MEDICARE

## 2023-09-21 VITALS
TEMPERATURE: 97.9 F | HEART RATE: 89 BPM | DIASTOLIC BLOOD PRESSURE: 78 MMHG | BODY MASS INDEX: 31.22 KG/M2 | WEIGHT: 243.3 LBS | RESPIRATION RATE: 18 BRPM | SYSTOLIC BLOOD PRESSURE: 132 MMHG | OXYGEN SATURATION: 95 % | HEIGHT: 74 IN

## 2023-09-21 DIAGNOSIS — R73.9 HYPERGLYCEMIA, UNSPECIFIED: ICD-10-CM

## 2023-09-21 DIAGNOSIS — N18.30 STAGE 3 CHRONIC KIDNEY DISEASE, UNSPECIFIED WHETHER STAGE 3A OR 3B CKD (HCC): ICD-10-CM

## 2023-09-21 DIAGNOSIS — E55.9 VITAMIN D DEFICIENCY, UNSPECIFIED: ICD-10-CM

## 2023-09-21 DIAGNOSIS — C61 MALIGNANT NEOPLASM OF PROSTATE (HCC): ICD-10-CM

## 2023-09-21 DIAGNOSIS — I10 ESSENTIAL (PRIMARY) HYPERTENSION: Primary | ICD-10-CM

## 2023-09-21 DIAGNOSIS — E78.00 PURE HYPERCHOLESTEROLEMIA, UNSPECIFIED: ICD-10-CM

## 2023-09-21 PROCEDURE — 1123F ACP DISCUSS/DSCN MKR DOCD: CPT | Performed by: INTERNAL MEDICINE

## 2023-09-21 PROCEDURE — G8427 DOCREV CUR MEDS BY ELIG CLIN: HCPCS | Performed by: INTERNAL MEDICINE

## 2023-09-21 PROCEDURE — 3017F COLORECTAL CA SCREEN DOC REV: CPT | Performed by: INTERNAL MEDICINE

## 2023-09-21 PROCEDURE — 1036F TOBACCO NON-USER: CPT | Performed by: INTERNAL MEDICINE

## 2023-09-21 PROCEDURE — 99214 OFFICE O/P EST MOD 30 MIN: CPT | Performed by: INTERNAL MEDICINE

## 2023-09-21 PROCEDURE — G8417 CALC BMI ABV UP PARAM F/U: HCPCS | Performed by: INTERNAL MEDICINE

## 2023-09-21 PROCEDURE — 3078F DIAST BP <80 MM HG: CPT | Performed by: INTERNAL MEDICINE

## 2023-09-21 PROCEDURE — 3075F SYST BP GE 130 - 139MM HG: CPT | Performed by: INTERNAL MEDICINE

## 2023-09-21 SDOH — ECONOMIC STABILITY: HOUSING INSECURITY
IN THE LAST 12 MONTHS, WAS THERE A TIME WHEN YOU DID NOT HAVE A STEADY PLACE TO SLEEP OR SLEPT IN A SHELTER (INCLUDING NOW)?: NO

## 2023-09-21 SDOH — ECONOMIC STABILITY: INCOME INSECURITY: HOW HARD IS IT FOR YOU TO PAY FOR THE VERY BASICS LIKE FOOD, HOUSING, MEDICAL CARE, AND HEATING?: NOT HARD AT ALL

## 2023-09-21 SDOH — ECONOMIC STABILITY: FOOD INSECURITY: WITHIN THE PAST 12 MONTHS, THE FOOD YOU BOUGHT JUST DIDN'T LAST AND YOU DIDN'T HAVE MONEY TO GET MORE.: NEVER TRUE

## 2023-09-21 SDOH — ECONOMIC STABILITY: FOOD INSECURITY: WITHIN THE PAST 12 MONTHS, YOU WORRIED THAT YOUR FOOD WOULD RUN OUT BEFORE YOU GOT MONEY TO BUY MORE.: NEVER TRUE

## 2023-09-21 NOTE — PROGRESS NOTES
1. \"Have you been to the ER, urgent care clinic since your last visit? Hospitalized since your last visit? \" No    2. \"Have you seen or consulted any other health care providers outside of the [address] [place] [address] since your last visit? \" No     3. For patients aged 43-73: Has the patient had a colonoscopy / FIT/ Cologuard?  Yes - no Care Gap present

## 2023-09-21 NOTE — PROGRESS NOTES
PROGRESS NOTE  Name: Vandana Mcarthur. : 1955       ASSESSMENT/ PLAN:     HTN (hypertension): BP borderline today. Hypercholesteremia: Simvastatin. Due for recheck. CKD (chronic kidney disease), stage 3 (moderate): GFR as above--normal.  Mild. Dr. Nikki Johns cut him loose. Prostate cancer: s/p XRT and previous follow up Dr. Anabella Hoang. Now seeing urologist, Dr. Livia Guzmán again. Knee pain, right:  Doing better. Sarcoid: He has seen Dr. Landry Gordon. Hyperglycemia / prediabetes: Watch diet and exercise. Colon cancer screening/polyps: Per Dr. Luz Marina Zepeda. Follow up in about 6 months. I have reviewed the patient's medications and risks/side effects/benefits were discussed. Diagnosis(-es) explained to patient and questions answered. Literature provided where appropriate. SUBJECTIVE:   Mr. Vandana Mcarthur. is a 79 y.o. male who presents today for follow up. Previously he saw Dr. Abbie Ovalle, and before that Dr. Leslye Nassar. Chief Complaint   Patient presents with    Follow-up     6 month fu, patient declines vaccine       Back pain stable. \"Comes and goes\"--OK at this time. Weight is down a bit:   Wt Readings from Last 3 Encounters:   23 243 lb 4.8 oz (110.4 kg)   23 248 lb (112.5 kg)   22 246 lb 9.6 oz (111.9 kg)     Prostate Cancer: Sees Dr. Livia Guzmán. He completed XRT in 2014. He is no longer seeing Dr. Nikki Johns, for CKD--\"as needed if the labs get worse. \"     He saw Dr. Landry Gordon at one time and had PFT, due to sarcoid. This issue is quiescent. He had diagnosis years ago, with liver biopsy. He gets yearly eye check. At this time, he is otherwise doing well and has brought no other complaints to my attention today. For a list of the medical issues addressed today, see the assessment and plan above.     PMH:   Past Medical History:   Diagnosis Date    Arthritis     CKD (chronic kidney disease) 2014    Hypercholesterolemia     Hypertension

## 2023-09-22 LAB
25(OH)D3 SERPL-MCNC: 49.6 NG/ML (ref 30–100)
ALBUMIN SERPL-MCNC: 3.7 G/DL (ref 3.5–5)
ALBUMIN/GLOB SERPL: 1 (ref 1.1–2.2)
ALP SERPL-CCNC: 118 U/L (ref 45–117)
ALT SERPL-CCNC: 45 U/L (ref 12–78)
ANION GAP SERPL CALC-SCNC: 2 MMOL/L (ref 5–15)
AST SERPL-CCNC: 26 U/L (ref 15–37)
BASOPHILS # BLD: 0 K/UL (ref 0–0.1)
BASOPHILS NFR BLD: 0 % (ref 0–1)
BILIRUB SERPL-MCNC: 0.9 MG/DL (ref 0.2–1)
BUN SERPL-MCNC: 16 MG/DL (ref 6–20)
BUN/CREAT SERPL: 12 (ref 12–20)
CALCIUM SERPL-MCNC: 9.3 MG/DL (ref 8.5–10.1)
CHLORIDE SERPL-SCNC: 105 MMOL/L (ref 97–108)
CHOLEST SERPL-MCNC: 176 MG/DL
CO2 SERPL-SCNC: 31 MMOL/L (ref 21–32)
CREAT SERPL-MCNC: 1.33 MG/DL (ref 0.7–1.3)
DIFFERENTIAL METHOD BLD: NORMAL
EOSINOPHIL # BLD: 0.1 K/UL (ref 0–0.4)
EOSINOPHIL NFR BLD: 1 % (ref 0–7)
ERYTHROCYTE [DISTWIDTH] IN BLOOD BY AUTOMATED COUNT: 12.7 % (ref 11.5–14.5)
EST. AVERAGE GLUCOSE BLD GHB EST-MCNC: 123 MG/DL
GLOBULIN SER CALC-MCNC: 3.8 G/DL (ref 2–4)
GLUCOSE SERPL-MCNC: 106 MG/DL (ref 65–100)
HBA1C MFR BLD: 5.9 % (ref 4–5.6)
HCT VFR BLD AUTO: 45.6 % (ref 36.6–50.3)
HDLC SERPL-MCNC: 52 MG/DL
HDLC SERPL: 3.4 (ref 0–5)
HGB BLD-MCNC: 15.5 G/DL (ref 12.1–17)
IMM GRANULOCYTES # BLD AUTO: 0 K/UL (ref 0–0.04)
IMM GRANULOCYTES NFR BLD AUTO: 0 % (ref 0–0.5)
LDLC SERPL CALC-MCNC: 92 MG/DL (ref 0–100)
LYMPHOCYTES # BLD: 1.9 K/UL (ref 0.8–3.5)
LYMPHOCYTES NFR BLD: 21 % (ref 12–49)
MCH RBC QN AUTO: 31.8 PG (ref 26–34)
MCHC RBC AUTO-ENTMCNC: 34 G/DL (ref 30–36.5)
MCV RBC AUTO: 93.6 FL (ref 80–99)
MONOCYTES # BLD: 0.6 K/UL (ref 0–1)
MONOCYTES NFR BLD: 7 % (ref 5–13)
NEUTS SEG # BLD: 6.3 K/UL (ref 1.8–8)
NEUTS SEG NFR BLD: 71 % (ref 32–75)
NRBC # BLD: 0 K/UL (ref 0–0.01)
NRBC BLD-RTO: 0 PER 100 WBC
PLATELET # BLD AUTO: 157 K/UL (ref 150–400)
PMV BLD AUTO: 12.4 FL (ref 8.9–12.9)
POTASSIUM SERPL-SCNC: 4.1 MMOL/L (ref 3.5–5.1)
PROT SERPL-MCNC: 7.5 G/DL (ref 6.4–8.2)
RBC # BLD AUTO: 4.87 M/UL (ref 4.1–5.7)
SODIUM SERPL-SCNC: 138 MMOL/L (ref 136–145)
TRIGL SERPL-MCNC: 160 MG/DL
VLDLC SERPL CALC-MCNC: 32 MG/DL
WBC # BLD AUTO: 8.9 K/UL (ref 4.1–11.1)

## 2023-09-23 LAB
PSA FREE MFR SERPL: 6.4 %
PSA FREE SERPL-MCNC: 0.36 NG/ML
PSA SERPL-MCNC: 5.6 NG/ML (ref 0–4)

## 2023-10-05 ENCOUNTER — TELEPHONE (OUTPATIENT)
Age: 68
End: 2023-10-05

## 2023-10-05 NOTE — TELEPHONE ENCOUNTER
Patient states he needs a call back in reference to getting his Lab Results & also patient would like a copy mailed to his home address confirmed correct on file. Please call to advise on results.  Thank you

## 2023-10-10 NOTE — TELEPHONE ENCOUNTER
I spoke to the patient about his lab results. He did receive his lab letter. Patient verbalized understanding of information discussed w/ no further questions at this time.

## 2024-03-25 RX ORDER — LOSARTAN POTASSIUM AND HYDROCHLOROTHIAZIDE 12.5; 5 MG/1; MG/1
1 TABLET ORAL DAILY
Qty: 90 TABLET | Refills: 3 | Status: SHIPPED | OUTPATIENT
Start: 2024-03-25

## 2024-04-09 ENCOUNTER — TRANSCRIBE ORDERS (OUTPATIENT)
Facility: HOSPITAL | Age: 69
End: 2024-04-09

## 2024-04-09 DIAGNOSIS — Z79.818 ANDROGEN DEPRIVATION THERAPY: Primary | ICD-10-CM

## 2024-04-23 ENCOUNTER — HOSPITAL ENCOUNTER (OUTPATIENT)
Facility: HOSPITAL | Age: 69
Discharge: HOME | End: 2024-04-26

## 2024-04-23 VITALS
SYSTOLIC BLOOD PRESSURE: 152 MMHG | HEART RATE: 82 BPM | WEIGHT: 245 LBS | HEIGHT: 73 IN | RESPIRATION RATE: 18 BRPM | DIASTOLIC BLOOD PRESSURE: 97 MMHG | BODY MASS INDEX: 32.47 KG/M2

## 2024-04-23 DIAGNOSIS — C79.51 CANCER, METASTATIC TO BONE (HCC): ICD-10-CM

## 2024-04-23 DIAGNOSIS — R97.21 INCREASING PROSTATE SPECIFIC ANTIGEN (PSA) LEVEL AFTER TREATMENT FOR MALIGNANT NEOPLASM OF PROSTATE: ICD-10-CM

## 2024-04-23 DIAGNOSIS — C61 PROSTATE CANCER (HCC): Primary | ICD-10-CM

## 2024-04-23 RX ORDER — ABIRATERONE ACETATE 250 MG/1
TABLET ORAL
COMMUNITY
Start: 2024-04-09

## 2024-04-23 RX ORDER — BICALUTAMIDE 50 MG/1
TABLET ORAL
COMMUNITY
Start: 2024-04-11 | End: 2024-05-11

## 2024-04-23 RX ORDER — PREDNISONE 5 MG/1
TABLET ORAL
COMMUNITY
Start: 2024-04-09 | End: 2024-06-08

## 2024-04-23 ASSESSMENT — PAIN SCALES - GENERAL: PAINLEVEL_OUTOF10: 0

## 2024-04-23 NOTE — CONSULTS
revealed cancer in 1/12 cores with Juvenal 3 + 4 in the single positive core.  The patient received definitive radiation 12/2/2013 - 2/3/2014 with Dr Uribe, with a total of 77.4 Gy IMRT to the prostate.   His PSA primo was 1.6 ng/mL on 6/25/2015 and started to rise after that. He reached the [place] definition of biochemical failure 1/14/2017 with a PSA of 3.9 ng/mL.   He had a number of imaging studies which did not demonstrate disease including MRI 1/29/18, nuclear bone scan 8/29/18, Ax[facility] PET/CT 6/6/19, and CT a/p and nuclear bone scan on 7/2/2020. On 9/9/2020, his PSA reached 31 ng/mL.   Axumin PET/CT 12/2/2020 demontrated \"No abnormal increased uptake in the prostate; no evidence for local recurrence. Hypermetabolic left internal iliac lymph nodes are noted concerning for metastatic disease.\" No activity was noted in bones or other distant metastatic sites.   PSA was 42.4 ng/mL on 12/9/2020.   He had a prostate biopsy biopsy 12/2/2020 with Dr Felton. The prostate was 29 cc by TRUS, with clinical stage T1c. Pathology revealed only benign tissue.   He had a cancer talk with Dr Felton, and the plan was for ADT + abiraterone + radiation. He saw Sherrie Suarez for genetic counseling and his genetic testing is pending.   He started ADT with a 6 month Lupron depot on 12/9/2020.   He was accompanied to his consult by his wife. His urinary symptoms are mild with IPSS = 4, QOL = 2/mostly satisfied. TEJAS = 24. His last colonoscopy was 4 years ago and he states he will be due after a 7 year interval. He hasn't yet received his abiraterone in the mail, but expects it today.      PHYSICAL EXAM:     Vital Signs for this encounter:  BSA: 2.39 meters squared  BP (!) 152/97   Pulse 82   Resp 18   Ht 1.854 m (6' 1\")   Wt 111.1 kg (245 lb)   BMI 32.32 kg/m²   Performance status: ECOG 0, fully active, able to carry on all predisease activities without restrictions  Constitutional: No evidence of impaired alertness,

## 2024-05-03 ENCOUNTER — HOSPITAL ENCOUNTER (OUTPATIENT)
Facility: HOSPITAL | Age: 69
Discharge: HOME | End: 2024-05-06
Attending: RADIOLOGY

## 2024-05-14 ENCOUNTER — TELEPHONE (OUTPATIENT)
Age: 69
End: 2024-05-14

## 2024-05-14 NOTE — TELEPHONE ENCOUNTER
The patient wanted to know what Bicalutamide was. I told him it was the generic name for CASODEX, his chemo tablet.  While I was on the phone, I told him he is due for a follow up appt.  Patient verbalized understanding of information discussed w/ no further questions at this time.

## 2024-05-17 ENCOUNTER — HOSPITAL ENCOUNTER (OUTPATIENT)
Facility: HOSPITAL | Age: 69
End: 2024-05-17
Attending: UROLOGY
Payer: MEDICARE

## 2024-05-17 DIAGNOSIS — Z79.818 ANDROGEN DEPRIVATION THERAPY: ICD-10-CM

## 2024-05-17 PROCEDURE — 77080 DXA BONE DENSITY AXIAL: CPT

## 2024-05-23 ENCOUNTER — HOSPITAL ENCOUNTER (OUTPATIENT)
Facility: HOSPITAL | Age: 69
Discharge: HOME | End: 2024-05-26
Attending: RADIOLOGY

## 2024-05-23 DIAGNOSIS — C79.51 CANCER, METASTATIC TO BONE (HCC): ICD-10-CM

## 2024-05-23 DIAGNOSIS — C61 PROSTATE CANCER (HCC): Primary | ICD-10-CM

## 2024-05-23 DIAGNOSIS — R97.21 INCREASING PROSTATE SPECIFIC ANTIGEN (PSA) LEVEL AFTER TREATMENT FOR MALIGNANT NEOPLASM OF PROSTATE: ICD-10-CM

## 2024-05-23 LAB
RAD ONC ARIA COURSE FIRST TREATMENT DATE: NORMAL
RAD ONC ARIA COURSE ID: NORMAL
RAD ONC ARIA COURSE INTENT: NORMAL
RAD ONC ARIA COURSE LAST TREATMENT DATE: NORMAL
RAD ONC ARIA COURSE SESSION NUMBER: 1
RAD ONC ARIA COURSE START DATE: NORMAL
RAD ONC ARIA COURSE TREATMENT ELAPSED DAYS: 0
RAD ONC ARIA PLAN FRACTIONS TREATED TO DATE: 1
RAD ONC ARIA PLAN FRACTIONS TREATED TO DATE: 1
RAD ONC ARIA PLAN ID: NORMAL
RAD ONC ARIA PLAN ID: NORMAL
RAD ONC ARIA PLAN PRESCRIBED DOSE PER FRACTION: 10 GY
RAD ONC ARIA PLAN PRESCRIBED DOSE PER FRACTION: 10 GY
RAD ONC ARIA PLAN PRIMARY REFERENCE POINT: NORMAL
RAD ONC ARIA PLAN PRIMARY REFERENCE POINT: NORMAL
RAD ONC ARIA PLAN TOTAL FRACTIONS PRESCRIBED: 3
RAD ONC ARIA PLAN TOTAL FRACTIONS PRESCRIBED: 3
RAD ONC ARIA PLAN TOTAL PRESCRIBED DOSE: 3000 CGY
RAD ONC ARIA PLAN TOTAL PRESCRIBED DOSE: 3000 CGY
RAD ONC ARIA REFERENCE POINT DOSAGE GIVEN TO DATE: 10 GY
RAD ONC ARIA REFERENCE POINT DOSAGE GIVEN TO DATE: 10 GY
RAD ONC ARIA REFERENCE POINT DOSAGE GIVEN TO DATE: 10.7 GY
RAD ONC ARIA REFERENCE POINT DOSAGE GIVEN TO DATE: 10.71 GY
RAD ONC ARIA REFERENCE POINT ID: NORMAL
RAD ONC ARIA REFERENCE POINT SESSION DOSAGE GIVEN: 10 GY
RAD ONC ARIA REFERENCE POINT SESSION DOSAGE GIVEN: 10 GY
RAD ONC ARIA REFERENCE POINT SESSION DOSAGE GIVEN: 10.7 GY
RAD ONC ARIA REFERENCE POINT SESSION DOSAGE GIVEN: 10.71 GY

## 2024-05-23 ASSESSMENT — PAIN SCALES - GENERAL: PAINLEVEL_OUTOF10: 0

## 2024-05-23 NOTE — PROGRESS NOTES
[facility]    Radiation Oncology Weekly Progress Note  Encounter Date: 24     Bernabe Rodriguez Jr.  MRN: [id number]  : 1955       ICD-10-CM    1. Prostate cancer (HCC)  C61       2. Increasing prostate specific antigen (PSA) level after treatment for malignant neoplasm of prostate  R97.21       3. Cancer, metastatic to bone (HCC)  C79.51           Diagnosis   Bony oligometastatic recurrence of prostate cancer. Pretreatment Juvenal 3 + 4 (1/12 cores), PSA 6.1, T1c. Definitive radiation consisting of 77.4 Gy IMRT to the prostate and proximal seminal vesicles, completed 2/3/2014. PSA primo 1.6 ng/mL on 2015. Axumin PET/CT 10/8/2020 demonstrates left pelvic gerber recurrence, with no disease in prostate or distant mets. PSA 42.4 ng/mL on 2020 prior to salvage. 6 months ADT started 2020 with Lupron and abiraterone. Salvage radiation to pelvic and abdominal lymph nodes (50.4 Gy IMRT to elective nodes, with SIB 70 Gy in 28 fractions to gross gerber disease), completed 2021.   Oligometastatic recurrence with four bony sites seen on PSMA PET/CT 3/27/2024. PSA 12.93 ng/mL prior to salvage. ADT started 4/15/2024 with concomitant abiraterone. SBRT to four sites: Left 7th and 11th ribs 10 Gy x 3, and R proximal femur and L pubic ramus each 16 Gy x 1, with 24 Gy x 1 to gross disease.   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     AJCC Staging has been reviewed.    Interval History   Mr. Rodriguez is a 68 y.o. male seen today for his weekly on-treatment evaluation    2024: Doing well today.  Seen after his first fraction of SBRT to each of the 2 rib sites.  His pelvic site and his right proximal femur site will each be treated in a single fraction, the rib sites each in 3 fractions total.  This will be all accomplished over a course of 5 total treatment days.  We reviewed the overall treatment plan

## 2024-05-24 ENCOUNTER — HOSPITAL ENCOUNTER (OUTPATIENT)
Facility: HOSPITAL | Age: 69
Discharge: HOME | End: 2024-05-27
Attending: RADIOLOGY

## 2024-05-24 LAB
RAD ONC ARIA COURSE FIRST TREATMENT DATE: NORMAL
RAD ONC ARIA COURSE ID: NORMAL
RAD ONC ARIA COURSE INTENT: NORMAL
RAD ONC ARIA COURSE LAST TREATMENT DATE: NORMAL
RAD ONC ARIA COURSE SESSION NUMBER: 2
RAD ONC ARIA COURSE START DATE: NORMAL
RAD ONC ARIA COURSE TREATMENT ELAPSED DAYS: 1
RAD ONC ARIA PLAN FRACTIONS TREATED TO DATE: 1
RAD ONC ARIA PLAN ID: NORMAL
RAD ONC ARIA PLAN PRESCRIBED DOSE PER FRACTION: 24 GY
RAD ONC ARIA PLAN PRIMARY REFERENCE POINT: NORMAL
RAD ONC ARIA PLAN TOTAL FRACTIONS PRESCRIBED: 1
RAD ONC ARIA PLAN TOTAL PRESCRIBED DOSE: 2400 CGY
RAD ONC ARIA REFERENCE POINT DOSAGE GIVEN TO DATE: 16 GY
RAD ONC ARIA REFERENCE POINT DOSAGE GIVEN TO DATE: 24 GY
RAD ONC ARIA REFERENCE POINT DOSAGE GIVEN TO DATE: 25.06 GY
RAD ONC ARIA REFERENCE POINT ID: NORMAL
RAD ONC ARIA REFERENCE POINT SESSION DOSAGE GIVEN: 16 GY
RAD ONC ARIA REFERENCE POINT SESSION DOSAGE GIVEN: 24 GY
RAD ONC ARIA REFERENCE POINT SESSION DOSAGE GIVEN: 25.06 GY

## 2024-05-24 ASSESSMENT — PATIENT HEALTH QUESTIONNAIRE - PHQ9
SUM OF ALL RESPONSES TO PHQ QUESTIONS 1-9: 0
1. LITTLE INTEREST OR PLEASURE IN DOING THINGS: NOT AT ALL
SUM OF ALL RESPONSES TO PHQ9 QUESTIONS 1 & 2: 0
2. FEELING DOWN, DEPRESSED OR HOPELESS: NOT AT ALL

## 2024-05-28 ENCOUNTER — HOSPITAL ENCOUNTER (OUTPATIENT)
Facility: HOSPITAL | Age: 69
Discharge: HOME | End: 2024-05-31
Attending: RADIOLOGY

## 2024-05-28 LAB
RAD ONC ARIA COURSE FIRST TREATMENT DATE: NORMAL
RAD ONC ARIA COURSE ID: NORMAL
RAD ONC ARIA COURSE INTENT: NORMAL
RAD ONC ARIA COURSE LAST TREATMENT DATE: NORMAL
RAD ONC ARIA COURSE SESSION NUMBER: 3
RAD ONC ARIA COURSE START DATE: NORMAL
RAD ONC ARIA COURSE TREATMENT ELAPSED DAYS: 5
RAD ONC ARIA PLAN FRACTIONS TREATED TO DATE: 2
RAD ONC ARIA PLAN FRACTIONS TREATED TO DATE: 2
RAD ONC ARIA PLAN ID: NORMAL
RAD ONC ARIA PLAN ID: NORMAL
RAD ONC ARIA PLAN PRESCRIBED DOSE PER FRACTION: 10 GY
RAD ONC ARIA PLAN PRESCRIBED DOSE PER FRACTION: 10 GY
RAD ONC ARIA PLAN PRIMARY REFERENCE POINT: NORMAL
RAD ONC ARIA PLAN PRIMARY REFERENCE POINT: NORMAL
RAD ONC ARIA PLAN TOTAL FRACTIONS PRESCRIBED: 3
RAD ONC ARIA PLAN TOTAL FRACTIONS PRESCRIBED: 3
RAD ONC ARIA PLAN TOTAL PRESCRIBED DOSE: 3000 CGY
RAD ONC ARIA PLAN TOTAL PRESCRIBED DOSE: 3000 CGY
RAD ONC ARIA REFERENCE POINT DOSAGE GIVEN TO DATE: 20 GY
RAD ONC ARIA REFERENCE POINT DOSAGE GIVEN TO DATE: 20 GY
RAD ONC ARIA REFERENCE POINT DOSAGE GIVEN TO DATE: 21.4 GY
RAD ONC ARIA REFERENCE POINT DOSAGE GIVEN TO DATE: 21.43 GY
RAD ONC ARIA REFERENCE POINT ID: NORMAL
RAD ONC ARIA REFERENCE POINT SESSION DOSAGE GIVEN: 10 GY
RAD ONC ARIA REFERENCE POINT SESSION DOSAGE GIVEN: 10 GY
RAD ONC ARIA REFERENCE POINT SESSION DOSAGE GIVEN: 10.7 GY
RAD ONC ARIA REFERENCE POINT SESSION DOSAGE GIVEN: 10.71 GY

## 2024-05-29 ENCOUNTER — HOSPITAL ENCOUNTER (OUTPATIENT)
Facility: HOSPITAL | Age: 69
Discharge: HOME | End: 2024-06-01
Attending: RADIOLOGY

## 2024-05-29 LAB
RAD ONC ARIA COURSE FIRST TREATMENT DATE: NORMAL
RAD ONC ARIA COURSE ID: NORMAL
RAD ONC ARIA COURSE INTENT: NORMAL
RAD ONC ARIA COURSE LAST TREATMENT DATE: NORMAL
RAD ONC ARIA COURSE SESSION NUMBER: 4
RAD ONC ARIA COURSE START DATE: NORMAL
RAD ONC ARIA COURSE TREATMENT ELAPSED DAYS: 6
RAD ONC ARIA PLAN FRACTIONS TREATED TO DATE: 1
RAD ONC ARIA PLAN ID: NORMAL
RAD ONC ARIA PLAN PRESCRIBED DOSE PER FRACTION: 24 GY
RAD ONC ARIA PLAN PRIMARY REFERENCE POINT: NORMAL
RAD ONC ARIA PLAN TOTAL FRACTIONS PRESCRIBED: 1
RAD ONC ARIA PLAN TOTAL PRESCRIBED DOSE: 2400 CGY
RAD ONC ARIA REFERENCE POINT DOSAGE GIVEN TO DATE: 16 GY
RAD ONC ARIA REFERENCE POINT DOSAGE GIVEN TO DATE: 24 GY
RAD ONC ARIA REFERENCE POINT DOSAGE GIVEN TO DATE: 25.45 GY
RAD ONC ARIA REFERENCE POINT ID: NORMAL
RAD ONC ARIA REFERENCE POINT SESSION DOSAGE GIVEN: 16 GY
RAD ONC ARIA REFERENCE POINT SESSION DOSAGE GIVEN: 24 GY
RAD ONC ARIA REFERENCE POINT SESSION DOSAGE GIVEN: 25.45 GY

## 2024-05-30 ENCOUNTER — HOSPITAL ENCOUNTER (OUTPATIENT)
Facility: HOSPITAL | Age: 69
Discharge: HOME | End: 2024-06-02
Attending: RADIOLOGY

## 2024-05-30 ENCOUNTER — CLINICAL DOCUMENTATION (OUTPATIENT)
Facility: HOSPITAL | Age: 69
End: 2024-05-30

## 2024-05-30 DIAGNOSIS — R97.21 INCREASING PROSTATE SPECIFIC ANTIGEN (PSA) LEVEL AFTER TREATMENT FOR MALIGNANT NEOPLASM OF PROSTATE: ICD-10-CM

## 2024-05-30 DIAGNOSIS — C79.51 CANCER, METASTATIC TO BONE (HCC): ICD-10-CM

## 2024-05-30 DIAGNOSIS — C61 PROSTATE CANCER (HCC): Primary | ICD-10-CM

## 2024-05-30 LAB
RAD ONC ARIA COURSE FIRST TREATMENT DATE: NORMAL
RAD ONC ARIA COURSE ID: NORMAL
RAD ONC ARIA COURSE INTENT: NORMAL
RAD ONC ARIA COURSE LAST TREATMENT DATE: NORMAL
RAD ONC ARIA COURSE SESSION NUMBER: 5
RAD ONC ARIA COURSE START DATE: NORMAL
RAD ONC ARIA COURSE TREATMENT ELAPSED DAYS: 7
RAD ONC ARIA PLAN FRACTIONS TREATED TO DATE: 3
RAD ONC ARIA PLAN FRACTIONS TREATED TO DATE: 3
RAD ONC ARIA PLAN ID: NORMAL
RAD ONC ARIA PLAN ID: NORMAL
RAD ONC ARIA PLAN PRESCRIBED DOSE PER FRACTION: 10 GY
RAD ONC ARIA PLAN PRESCRIBED DOSE PER FRACTION: 10 GY
RAD ONC ARIA PLAN PRIMARY REFERENCE POINT: NORMAL
RAD ONC ARIA PLAN PRIMARY REFERENCE POINT: NORMAL
RAD ONC ARIA PLAN TOTAL FRACTIONS PRESCRIBED: 3
RAD ONC ARIA PLAN TOTAL FRACTIONS PRESCRIBED: 3
RAD ONC ARIA PLAN TOTAL PRESCRIBED DOSE: 3000 CGY
RAD ONC ARIA PLAN TOTAL PRESCRIBED DOSE: 3000 CGY
RAD ONC ARIA REFERENCE POINT DOSAGE GIVEN TO DATE: 30 GY
RAD ONC ARIA REFERENCE POINT DOSAGE GIVEN TO DATE: 30 GY
RAD ONC ARIA REFERENCE POINT DOSAGE GIVEN TO DATE: 32.1 GY
RAD ONC ARIA REFERENCE POINT DOSAGE GIVEN TO DATE: 32.14 GY
RAD ONC ARIA REFERENCE POINT ID: NORMAL
RAD ONC ARIA REFERENCE POINT SESSION DOSAGE GIVEN: 10 GY
RAD ONC ARIA REFERENCE POINT SESSION DOSAGE GIVEN: 10 GY
RAD ONC ARIA REFERENCE POINT SESSION DOSAGE GIVEN: 10.7 GY
RAD ONC ARIA REFERENCE POINT SESSION DOSAGE GIVEN: 10.71 GY

## 2024-10-17 ENCOUNTER — OFFICE VISIT (OUTPATIENT)
Age: 69
End: 2024-10-17
Payer: MEDICARE

## 2024-10-17 VITALS
SYSTOLIC BLOOD PRESSURE: 149 MMHG | DIASTOLIC BLOOD PRESSURE: 90 MMHG | TEMPERATURE: 97.7 F | BODY MASS INDEX: 31.57 KG/M2 | OXYGEN SATURATION: 98 % | HEIGHT: 74 IN | HEART RATE: 91 BPM | WEIGHT: 246 LBS | RESPIRATION RATE: 16 BRPM

## 2024-10-17 DIAGNOSIS — N18.30 STAGE 3 CHRONIC KIDNEY DISEASE, UNSPECIFIED WHETHER STAGE 3A OR 3B CKD (HCC): ICD-10-CM

## 2024-10-17 DIAGNOSIS — E78.00 PURE HYPERCHOLESTEROLEMIA, UNSPECIFIED: ICD-10-CM

## 2024-10-17 DIAGNOSIS — I10 ESSENTIAL (PRIMARY) HYPERTENSION: ICD-10-CM

## 2024-10-17 DIAGNOSIS — R73.9 HYPERGLYCEMIA, UNSPECIFIED: ICD-10-CM

## 2024-10-17 DIAGNOSIS — I10 ESSENTIAL (PRIMARY) HYPERTENSION: Primary | ICD-10-CM

## 2024-10-17 DIAGNOSIS — E55.9 VITAMIN D DEFICIENCY, UNSPECIFIED: ICD-10-CM

## 2024-10-17 PROCEDURE — 1123F ACP DISCUSS/DSCN MKR DOCD: CPT | Performed by: INTERNAL MEDICINE

## 2024-10-17 PROCEDURE — G8417 CALC BMI ABV UP PARAM F/U: HCPCS | Performed by: INTERNAL MEDICINE

## 2024-10-17 PROCEDURE — 3017F COLORECTAL CA SCREEN DOC REV: CPT | Performed by: INTERNAL MEDICINE

## 2024-10-17 PROCEDURE — 99214 OFFICE O/P EST MOD 30 MIN: CPT | Performed by: INTERNAL MEDICINE

## 2024-10-17 PROCEDURE — G8484 FLU IMMUNIZE NO ADMIN: HCPCS | Performed by: INTERNAL MEDICINE

## 2024-10-17 PROCEDURE — 1036F TOBACCO NON-USER: CPT | Performed by: INTERNAL MEDICINE

## 2024-10-17 PROCEDURE — G8427 DOCREV CUR MEDS BY ELIG CLIN: HCPCS | Performed by: INTERNAL MEDICINE

## 2024-10-17 PROCEDURE — 3077F SYST BP >= 140 MM HG: CPT | Performed by: INTERNAL MEDICINE

## 2024-10-17 PROCEDURE — 3080F DIAST BP >= 90 MM HG: CPT | Performed by: INTERNAL MEDICINE

## 2024-10-17 NOTE — PROGRESS NOTES
\"Have you been to the ER, urgent care clinic since your last visit?  Hospitalized since your last visit?\"    NO    “Have you seen or consulted any other health care providers outside our system since your last visit?”    NO               
a list of the medical issues addressed today, see the assessment and plan above.    PMH:   Past Medical History:   Diagnosis Date    Arthritis     CKD (chronic kidney disease) 2014    Hypercholesterolemia     Hypertension     Increasing prostate specific antigen (PSA) level after treatment for malignant neoplasm of prostate 2020    After XRT his PSA went from 6.1 to 1.3. It stayed below 3 until 17. His PSA was increasingto 4.0 17. Rebiopsy of his prostate 17 revealed benign prostate tissue. 35cc gland. PSA 11.1 on 8/3/18. Repeat biopsy 18 revealed benign tissue. PSA 18 was 11.7. PSA 19 was 16.2 with 6.4% free. 19 Axumin PET did not reveal active disease. PSA 3/9/2020: 35.4 per PCP labs. PSA 6/3    Prostate cancer (HCC) 2014    Sarcoid        Past Surgical History:   Procedure Laterality Date    CIRCUMCISION      HERNIA REPAIR      KNEE ARTHROSCOPY      PROSTATECTOMY      TONSILLECTOMY         All: Patient has no known allergies.     Current Outpatient Medications   Medication Sig    ZYTIGA 250 MG tablet Zytiga 250 mg tablet    losartan-hydroCHLOROthiazide (HYZAAR) 50-12.5 MG per tablet TAKE 1 TABLET BY MOUTH EVERY DAY    simvastatin (ZOCOR) 10 MG tablet TAKE 1 TABLET BY MOUTH NIGHTLY     No current facility-administered medications for this visit.         FH: His mother  76 of lung cancer, HTN.  Father  of lung cancer age 76.  One sister  age 3 of heart defect.     SH: Works for [employer] part time. 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 He reports that he quit smoking about 52 years ago. His smoking use included cigarettes. He has never used smokeless tobacco. He reports current alcohol use. He reports that he does not use drugs.     ROS: See above; Complete ROS otherwise negative.     OBJECTIVE:   Vitals: BP (!) 149/90 (Site: Left Upper Arm, Position: Sitting, Cuff Size: Large Adult)   Pulse 91   Temp 97.7

## 2024-10-18 LAB
25(OH)D3 SERPL-MCNC: 52.6 NG/ML (ref 30–100)
ALBUMIN SERPL-MCNC: 3.6 G/DL (ref 3.5–5)
ALBUMIN/GLOB SERPL: 0.9 (ref 1.1–2.2)
ALP SERPL-CCNC: 134 U/L (ref 45–117)
ALT SERPL-CCNC: 28 U/L (ref 12–78)
ANION GAP SERPL CALC-SCNC: 6 MMOL/L (ref 2–12)
AST SERPL-CCNC: 17 U/L (ref 15–37)
BASOPHILS # BLD: 0 K/UL (ref 0–0.1)
BASOPHILS NFR BLD: 0 % (ref 0–1)
BILIRUB SERPL-MCNC: 0.7 MG/DL (ref 0.2–1)
BUN SERPL-MCNC: 17 MG/DL (ref 6–20)
BUN/CREAT SERPL: 14 (ref 12–20)
CALCIUM SERPL-MCNC: 9.9 MG/DL (ref 8.5–10.1)
CHLORIDE SERPL-SCNC: 106 MMOL/L (ref 97–108)
CHOLEST SERPL-MCNC: 213 MG/DL
CO2 SERPL-SCNC: 31 MMOL/L (ref 21–32)
CREAT SERPL-MCNC: 1.24 MG/DL (ref 0.7–1.3)
DIFFERENTIAL METHOD BLD: NORMAL
EOSINOPHIL # BLD: 0 K/UL (ref 0–0.4)
EOSINOPHIL NFR BLD: 0 % (ref 0–7)
ERYTHROCYTE [DISTWIDTH] IN BLOOD BY AUTOMATED COUNT: 12.6 % (ref 11.5–14.5)
EST. AVERAGE GLUCOSE BLD GHB EST-MCNC: 126 MG/DL
GLOBULIN SER CALC-MCNC: 3.8 G/DL (ref 2–4)
GLUCOSE SERPL-MCNC: 99 MG/DL (ref 65–100)
HBA1C MFR BLD: 6 % (ref 4–5.6)
HCT VFR BLD AUTO: 39.7 % (ref 36.6–50.3)
HDLC SERPL-MCNC: 58 MG/DL
HDLC SERPL: 3.7 (ref 0–5)
HGB BLD-MCNC: 13.6 G/DL (ref 12.1–17)
IMM GRANULOCYTES # BLD AUTO: 0 K/UL (ref 0–0.04)
IMM GRANULOCYTES NFR BLD AUTO: 0 % (ref 0–0.5)
LDLC SERPL CALC-MCNC: 106.6 MG/DL (ref 0–100)
LYMPHOCYTES # BLD: 1.6 K/UL (ref 0.8–3.5)
LYMPHOCYTES NFR BLD: 19 % (ref 12–49)
MCH RBC QN AUTO: 33.1 PG (ref 26–34)
MCHC RBC AUTO-ENTMCNC: 34.3 G/DL (ref 30–36.5)
MCV RBC AUTO: 96.6 FL (ref 80–99)
MONOCYTES # BLD: 0.5 K/UL (ref 0–1)
MONOCYTES NFR BLD: 6 % (ref 5–13)
NEUTS SEG # BLD: 6.3 K/UL (ref 1.8–8)
NEUTS SEG NFR BLD: 75 % (ref 32–75)
NRBC # BLD: 0 K/UL (ref 0–0.01)
NRBC BLD-RTO: 0 PER 100 WBC
PLATELET # BLD AUTO: 182 K/UL (ref 150–400)
PMV BLD AUTO: 11.4 FL (ref 8.9–12.9)
POTASSIUM SERPL-SCNC: 4.3 MMOL/L (ref 3.5–5.1)
PROT SERPL-MCNC: 7.4 G/DL (ref 6.4–8.2)
RBC # BLD AUTO: 4.11 M/UL (ref 4.1–5.7)
SODIUM SERPL-SCNC: 143 MMOL/L (ref 136–145)
TRIGL SERPL-MCNC: 242 MG/DL
VLDLC SERPL CALC-MCNC: 48.4 MG/DL
WBC # BLD AUTO: 8.4 K/UL (ref 4.1–11.1)

## 2024-11-25 DIAGNOSIS — E78.00 PURE HYPERCHOLESTEROLEMIA, UNSPECIFIED: Primary | ICD-10-CM

## 2024-11-25 RX ORDER — SIMVASTATIN 10 MG
10 TABLET ORAL NIGHTLY
Qty: 90 TABLET | Refills: 3 | Status: SHIPPED | OUTPATIENT
Start: 2024-11-25

## 2024-11-25 RX ORDER — SIMVASTATIN 10 MG
TABLET ORAL
Qty: 90 TABLET | Refills: 3 | Status: SHIPPED | OUTPATIENT
Start: 2024-11-25 | End: 2024-11-25

## 2024-11-25 NOTE — TELEPHONE ENCOUNTER
Patient requesting the medication for   simvastatin (ZOCOR) 10 MG tablet [[id number]]     Patient confirmed pharmacy.

## 2024-11-25 NOTE — TELEPHONE ENCOUNTER
PCP: Luis Miguel Rivera MD    Last appt: 10/17/2024  Future Appointments   Date Time Provider Department Center   12/27/2024  8:15 AM LEONID Lopez MD [facility]   4/17/2025 10:15 AM Luis Miguel Rivera MD [facility] DE[facility]       Requested Prescriptions     Pending Prescriptions Disp Refills    simvastatin (ZOCOR) 10 MG tablet 90 tablet 3     Sig: Take 1 tablet by mouth nightly

## 2025-04-07 RX ORDER — LOSARTAN POTASSIUM AND HYDROCHLOROTHIAZIDE 12.5; 5 MG/1; MG/1
1 TABLET ORAL DAILY
Qty: 90 TABLET | Refills: 3 | Status: SHIPPED | OUTPATIENT
Start: 2025-04-07

## 2025-04-17 ENCOUNTER — OFFICE VISIT (OUTPATIENT)
Age: 70
End: 2025-04-17
Payer: MEDICARE

## 2025-04-17 VITALS
OXYGEN SATURATION: 95 % | RESPIRATION RATE: 28 BRPM | WEIGHT: 265 LBS | SYSTOLIC BLOOD PRESSURE: 133 MMHG | BODY MASS INDEX: 34.01 KG/M2 | HEART RATE: 91 BPM | HEIGHT: 74 IN | TEMPERATURE: 98.6 F | DIASTOLIC BLOOD PRESSURE: 74 MMHG

## 2025-04-17 DIAGNOSIS — C61 MALIGNANT NEOPLASM OF PROSTATE (HCC): ICD-10-CM

## 2025-04-17 DIAGNOSIS — C79.51 CANCER, METASTATIC TO BONE (HCC): ICD-10-CM

## 2025-04-17 DIAGNOSIS — E78.00 PURE HYPERCHOLESTEROLEMIA, UNSPECIFIED: ICD-10-CM

## 2025-04-17 DIAGNOSIS — R73.9 HYPERGLYCEMIA, UNSPECIFIED: ICD-10-CM

## 2025-04-17 DIAGNOSIS — N18.30 STAGE 3 CHRONIC KIDNEY DISEASE, UNSPECIFIED WHETHER STAGE 3A OR 3B CKD (HCC): ICD-10-CM

## 2025-04-17 DIAGNOSIS — I10 ESSENTIAL (PRIMARY) HYPERTENSION: Primary | ICD-10-CM

## 2025-04-17 DIAGNOSIS — K63.5 POLYP OF COLON, UNSPECIFIED PART OF COLON, UNSPECIFIED TYPE: ICD-10-CM

## 2025-04-17 DIAGNOSIS — E55.9 VITAMIN D DEFICIENCY, UNSPECIFIED: ICD-10-CM

## 2025-04-17 PROCEDURE — 99214 OFFICE O/P EST MOD 30 MIN: CPT | Performed by: INTERNAL MEDICINE

## 2025-04-17 PROCEDURE — 3078F DIAST BP <80 MM HG: CPT | Performed by: INTERNAL MEDICINE

## 2025-04-17 PROCEDURE — 1123F ACP DISCUSS/DSCN MKR DOCD: CPT | Performed by: INTERNAL MEDICINE

## 2025-04-17 PROCEDURE — 1159F MED LIST DOCD IN RCRD: CPT | Performed by: INTERNAL MEDICINE

## 2025-04-17 PROCEDURE — 3075F SYST BP GE 130 - 139MM HG: CPT | Performed by: INTERNAL MEDICINE

## 2025-04-17 RX ORDER — DAROLUTAMIDE 300 MG/1
TABLET, FILM COATED ORAL
COMMUNITY
Start: 2025-02-17

## 2025-04-17 SDOH — ECONOMIC STABILITY: FOOD INSECURITY: WITHIN THE PAST 12 MONTHS, THE FOOD YOU BOUGHT JUST DIDN'T LAST AND YOU DIDN'T HAVE MONEY TO GET MORE.: NEVER TRUE

## 2025-04-17 SDOH — ECONOMIC STABILITY: FOOD INSECURITY: WITHIN THE PAST 12 MONTHS, YOU WORRIED THAT YOUR FOOD WOULD RUN OUT BEFORE YOU GOT MONEY TO BUY MORE.: NEVER TRUE

## 2025-04-17 ASSESSMENT — PATIENT HEALTH QUESTIONNAIRE - PHQ9
2. FEELING DOWN, DEPRESSED OR HOPELESS: NOT AT ALL
SUM OF ALL RESPONSES TO PHQ QUESTIONS 1-9: 0
1. LITTLE INTEREST OR PLEASURE IN DOING THINGS: NOT AT ALL

## 2025-04-17 NOTE — PROGRESS NOTES
PROGRESS NOTE  Name: Bernabe Rodriguez Jr.   : 1955       ASSESSMENT/ PLAN:     HTN (hypertension): BP borderline today.   Edema: Cut back salt. Recommended elevation and compression.   Hypercholesteremia: Simvastatin. Due for recheck.   CKD (chronic kidney disease), stage 3 (moderate): GFR as above--normal.  Mild. Dr. Proctor cut him loose.   Prostate cancer metastatic to bone: s/p XRT . S/p repeat XRT to bony lesions . On Leupron. On [place] in place of Zytiga. He is seeing urologist, Dr. Felton again.   Knee pain, right:  Ongoing, stable.   Sarcoid: He has seen Dr. Myers in the past.   Hyperglycemia / prediabetes: Watch diet and exercise.   Colon cancer screening/polyps: Per GI (formerly Dr. Toney).   Obesity: Diet, exercise.      Follow up in about 6 months.     I have reviewed the patient's medications and risks/side effects/benefits were discussed. Diagnosis(-es) explained to patient and questions answered. Literature provided where appropriate.                    SUBJECTIVE:   Mr. Bernabe Rodriguez Jr. is a 69 y.o. male who presents today for follow up.  Previously he saw Dr. Herb Calvert, and before that Dr. Rivera.     Chief Complaint   Patient presents with    Hypertension     Swollen ankles, feel numb sometimes.        He has edema of both lower extremities, unsure of onset.     Prostate Cancer: Sees Dr. Felton. He completed XRT in 2014. He had XRT for bony mets in . He is on Xytiga. Also prednisone.     Heartburn has been \"great\" since starting omeprazole.     Back pain stable.  \"Comes and goes\"--OK at this time.     Weight is down a bit:   Wt Readings from Last 3 Encounters:   25 120.2 kg (265 lb)   10/17/24 111.6 kg (246 lb)   24 111.1 kg (245 lb)     He is no longer seeing Dr. Proctor, for CKD--\"as needed if the labs get worse.\"     He saw Dr. Myers at one time and had PFT, due to sarcoid. This issue is quiescent. He had diagnosis years ago, with liver

## 2025-04-17 NOTE — PROGRESS NOTES
\"Have you been to the ER, urgent care clinic since your last visit?  Hospitalized since your last visit?\"    No.    “Have you seen or consulted any other health care providers outside our system since your last visit?”    No.    Did have a PET scan last year with Dr. Felton; records requested.

## 2025-04-18 LAB
25(OH)D3+25(OH)D2 SERPL-MCNC: 46.6 NG/ML (ref 30–100)
ALBUMIN SERPL-MCNC: 4 G/DL (ref 3.9–4.9)
ALP SERPL-CCNC: 166 IU/L (ref 44–121)
ALT SERPL-CCNC: 28 IU/L (ref 0–44)
AST SERPL-CCNC: 24 IU/L (ref 0–40)
BASOPHILS # BLD AUTO: 0 X10E3/UL (ref 0–0.2)
BASOPHILS NFR BLD AUTO: 1 %
BILIRUB SERPL-MCNC: 0.4 MG/DL (ref 0–1.2)
BUN SERPL-MCNC: 17 MG/DL (ref 8–27)
BUN/CREAT SERPL: 14 (ref 10–24)
CALCIUM SERPL-MCNC: 9.6 MG/DL (ref 8.6–10.2)
CHLORIDE SERPL-SCNC: 103 MMOL/L (ref 96–106)
CHOLEST SERPL-MCNC: 181 MG/DL (ref 100–199)
CO2 SERPL-SCNC: 25 MMOL/L (ref 20–29)
CREAT SERPL-MCNC: 1.21 MG/DL (ref 0.76–1.27)
EGFRCR SERPLBLD CKD-EPI 2021: 66 ML/MIN/1.73
EOSINOPHIL # BLD AUTO: 0.1 X10E3/UL (ref 0–0.4)
EOSINOPHIL NFR BLD AUTO: 1 %
ERYTHROCYTE [DISTWIDTH] IN BLOOD BY AUTOMATED COUNT: 13.5 % (ref 11.6–15.4)
GLOBULIN SER CALC-MCNC: 3.1 G/DL (ref 1.5–4.5)
GLUCOSE SERPL-MCNC: 144 MG/DL (ref 70–99)
HBA1C MFR BLD: 6.6 % (ref 4.8–5.6)
HCT VFR BLD AUTO: 40.8 % (ref 37.5–51)
HDLC SERPL-MCNC: 48 MG/DL
HGB BLD-MCNC: 13.6 G/DL (ref 13–17.7)
IMM GRANULOCYTES # BLD AUTO: 0 X10E3/UL (ref 0–0.1)
IMM GRANULOCYTES NFR BLD AUTO: 0 %
LDLC SERPL CALC-MCNC: 89 MG/DL (ref 0–99)
LYMPHOCYTES # BLD AUTO: 2 X10E3/UL (ref 0.7–3.1)
LYMPHOCYTES NFR BLD AUTO: 27 %
MCH RBC QN AUTO: 31.7 PG (ref 26.6–33)
MCHC RBC AUTO-ENTMCNC: 33.3 G/DL (ref 31.5–35.7)
MCV RBC AUTO: 95 FL (ref 79–97)
MONOCYTES # BLD AUTO: 0.6 X10E3/UL (ref 0.1–0.9)
MONOCYTES NFR BLD AUTO: 8 %
NEUTROPHILS # BLD AUTO: 4.6 X10E3/UL (ref 1.4–7)
NEUTROPHILS NFR BLD AUTO: 63 %
PLATELET # BLD AUTO: 191 X10E3/UL (ref 150–450)
POTASSIUM SERPL-SCNC: 4.2 MMOL/L (ref 3.5–5.2)
PROT SERPL-MCNC: 7.1 G/DL (ref 6–8.5)
RBC # BLD AUTO: 4.29 X10E6/UL (ref 4.14–5.8)
SODIUM SERPL-SCNC: 143 MMOL/L (ref 134–144)
TRIGL SERPL-MCNC: 267 MG/DL (ref 0–149)
VLDLC SERPL CALC-MCNC: 44 MG/DL (ref 5–40)
WBC # BLD AUTO: 7.3 X10E3/UL (ref 3.4–10.8)

## 2025-04-21 ENCOUNTER — RESULTS FOLLOW-UP (OUTPATIENT)
Age: 70
End: 2025-04-21

## 2025-04-21 RX ORDER — METFORMIN HYDROCHLORIDE 500 MG/1
500 TABLET, EXTENDED RELEASE ORAL
Qty: 90 TABLET | Refills: 3 | Status: SHIPPED | OUTPATIENT
Start: 2025-04-21

## 2025-04-24 NOTE — TELEPHONE ENCOUNTER
Spoke to pt and used two pt identifiers.  Patient notified of response from Luis Miguel Rivera MD    Per Dr. Rivera    He has diabetes mellitus, with hemoglobin A1c of 6.9.  I started him on metformin extended release 500 mg once a day. BJF     Patient states understanding

## 2025-04-24 NOTE — TELEPHONE ENCOUNTER
----- Message from Dr. Luis Miguel Rivera MD sent at 4/21/2025  4:31 PM EDT -----  He has diabetes. I'll start Metformin at low dose. BJF